# Patient Record
Sex: FEMALE | Race: WHITE | NOT HISPANIC OR LATINO | URBAN - METROPOLITAN AREA
[De-identification: names, ages, dates, MRNs, and addresses within clinical notes are randomized per-mention and may not be internally consistent; named-entity substitution may affect disease eponyms.]

---

## 2019-09-02 ENCOUNTER — HOSPITAL ENCOUNTER (OUTPATIENT)
Dept: URGENT CARE | Facility: CLINIC | Age: 24
Discharge: HOME | End: 2019-09-02

## 2019-09-04 LAB — BACTERIA SPEC AEROBE CULT: NORMAL

## 2023-06-05 ENCOUNTER — OFFICE VISIT (OUTPATIENT)
Dept: OBSTETRICS AND GYNECOLOGY | Age: 28
End: 2023-06-05
Payer: COMMERCIAL

## 2023-06-05 VITALS
HEIGHT: 68 IN | SYSTOLIC BLOOD PRESSURE: 114 MMHG | WEIGHT: 269.8 LBS | DIASTOLIC BLOOD PRESSURE: 80 MMHG | BODY MASS INDEX: 40.89 KG/M2

## 2023-06-05 DIAGNOSIS — Z32.00 ENCOUNTER FOR CONFIRMATION OF PREGNANCY TEST RESULT WITH PHYSICAL EXAMINATION: ICD-10-CM

## 2023-06-05 DIAGNOSIS — Z87.59 HISTORY OF GESTATIONAL HYPERTENSION: ICD-10-CM

## 2023-06-05 DIAGNOSIS — O21.9 NAUSEA AND VOMITING IN PREGNANCY: ICD-10-CM

## 2023-06-05 DIAGNOSIS — O99.210 OBESITY IN PREGNANCY, ANTEPARTUM: ICD-10-CM

## 2023-06-05 DIAGNOSIS — Z01.419 ENCOUNTER FOR GYNECOLOGICAL EXAMINATION: Primary | ICD-10-CM

## 2023-06-05 PROCEDURE — 99213 OFFICE O/P EST LOW 20 MIN: CPT | Performed by: NURSE PRACTITIONER

## 2023-06-05 PROCEDURE — 99395 PREV VISIT EST AGE 18-39: CPT | Performed by: NURSE PRACTITIONER

## 2023-06-05 PROCEDURE — 3008F BODY MASS INDEX DOCD: CPT | Performed by: NURSE PRACTITIONER

## 2023-06-05 PROCEDURE — 2014F MENTAL STATUS ASSESS: CPT | Performed by: NURSE PRACTITIONER

## 2023-06-05 NOTE — PROGRESS NOTES
"Subjective   Alicia Bailey is a 27 y.o. female is being seen today for   Chief Complaint   Patient presents with    [facility]     New pt for pregnancy confirmation, lmp 3/16. Last pap approximately 2017 (neg per pt).    .    History of Present Illness    New patient to our office  Here for AE and pregnancy confirmation  Per LMP is 11w4d but by our ultrasound today is 9w4d with SOAHN 1/4/24  This is her second pregnancy- she has a 7 yr old daughter \"María\" as well  Only problem during that pregnancy was increased BP towards the end   She had a full term vaginal delivery in [place]  She did have some spotting after IC last week but no bleeding since then  She is taking PNV  Over due on her pap  No family history of genetic disorders  Reports periods are very irregular so she isn't surprised that the dates don't line up      The following portions of the patient's history were reviewed and updated as appropriate: allergies, current medications, past family history, past medical history, past social history, past surgical history and problem list.    /80   Ht 172.7 cm (68\")   Wt 122 kg (269 lb 12.8 oz)   LMP 03/16/2023 (Exact Date)   BMI 41.02 kg/m²         Review of Systems   Constitutional: Negative.    HENT: Negative.     Eyes: Negative.    Respiratory: Negative.     Cardiovascular: Negative.    Gastrointestinal:  Positive for nausea and vomiting.   Endocrine: Negative.    Genitourinary: Negative.    Musculoskeletal: Negative.    Skin: Negative.    Allergic/Immunologic: Negative.    Neurological: Negative.    Hematological: Negative.    Psychiatric/Behavioral: Negative.       Objective   Physical Exam  Vitals reviewed.   Constitutional:       Appearance: She is well-developed.   Neck:      Thyroid: No thyroid mass.   Cardiovascular:      Rate and Rhythm: Normal rate and regular rhythm.      Heart sounds: Normal heart sounds.   Pulmonary:      Effort: Pulmonary effort is normal.      Breath sounds: " Normal breath sounds.   Chest:   Breasts:     Right: No mass, nipple discharge, skin change or tenderness.      Left: No mass, nipple discharge, skin change or tenderness.   Abdominal:      Palpations: Abdomen is soft.      Tenderness: There is no abdominal tenderness.   Genitourinary:     Labia:         Right: No rash or lesion.         Left: No rash or lesion.       Vagina: Normal.      Cervix: No cervical motion tenderness, discharge or friability.      Adnexa:         Right: No mass or tenderness.          Left: No mass or tenderness.     Neurological:      Mental Status: She is alert and oriented to person, place, and time.   Psychiatric:         Behavior: Behavior normal.         Assessment & Plan   Diagnoses and all orders for this visit:    1. Encounter for gynecological examination (Primary)  -     IGP,CtNg,rfx Apt HPV All Pth; Future    2. Encounter for confirmation of pregnancy test result with physical examination  -     Hemoglobin A1c  -     Urine Culture - Urine, Urine, Clean Catch  -     Varicella Zoster Antibody, IgG  -     TSH  -     OB Panel With HIV  -     Hemoglobinopathy Fractionation Cascade    3. Obesity in pregnancy, antepartum    4. Nausea and vomiting in pregnancy    5. History of gestational hypertension    Other orders  -     Doxylamine-Pyridoxine ER 20-20 MG tablet controlled-release; Take 1 tablet by mouth 2 (Two) Times a Day.  Dispense: 60 tablet; Refill: 3      Early pregnancy counseling provided and New OB folder given  Patient is taking Prenatal vitamins  Problem list reviewed and updated  Reviewed routine prenatal care with the office to include but not limited to expected weight gain during pregnancy, Tylenol products are fine, avoid aspirin and ibuprofen; Zika (travel restrictions/ok to use insect repellant); not to change cat litter; food restrictions; avoidance of alcohol, tobacco, drugs and saunas/hot tubs. Discussed that the COVID and Flu vaccine is safe and recommended in  pregnancy.   SAB warnings reviewed  All questions answered    Follow up in 1 week for lindsey lab only, then 4 weeks with  for OB intake       Total time spent today with Alicia  was 30-44 minutes (level 3).  Greater than 50% of the time was spent coordinating care, answering her questions and counseling regarding exercise in pregnancy, nutrition in pregnancy, weight gain in pregnancy, work and travel restrictions during pregnancy, list of OTC medications acceptable in pregnancy and call coverage groups and healthy eating habits.

## 2023-06-07 ENCOUNTER — PATIENT MESSAGE (OUTPATIENT)
Dept: OBSTETRICS AND GYNECOLOGY | Age: 28
End: 2023-06-07
Payer: COMMERCIAL

## 2023-06-07 ENCOUNTER — PATIENT ROUNDING (BHMG ONLY) (OUTPATIENT)
Dept: OBSTETRICS AND GYNECOLOGY | Age: 28
End: 2023-06-07
Payer: COMMERCIAL

## 2023-06-07 LAB
ABO GROUP BLD: NORMAL
BACTERIA UR CULT: NO GROWTH
BACTERIA UR CULT: NORMAL
BASOPHILS # BLD AUTO: 0.1 X10E3/UL (ref 0–0.2)
BASOPHILS NFR BLD AUTO: 1 %
BLD GP AB SCN SERPL QL: NEGATIVE
C TRACH RRNA CVX QL NAA+PROBE: NEGATIVE
CONV .: NORMAL
CYTOLOGIST CVX/VAG CYTO: NORMAL
CYTOLOGY CVX/VAG DOC CYTO: NORMAL
CYTOLOGY CVX/VAG DOC THIN PREP: NORMAL
DX ICD CODE: NORMAL
EOSINOPHIL # BLD AUTO: 0.2 X10E3/UL (ref 0–0.4)
EOSINOPHIL NFR BLD AUTO: 2 %
ERYTHROCYTE [DISTWIDTH] IN BLOOD BY AUTOMATED COUNT: 13.2 % (ref 11.7–15.4)
HBA1C MFR BLD: 5.5 % (ref 4.8–5.6)
HBV SURFACE AG SERPL QL IA: NEGATIVE
HCT VFR BLD AUTO: 38.3 % (ref 34–46.6)
HCV IGG SERPL QL IA: NON REACTIVE
HGB A MFR BLD ELPH: 97.3 % (ref 96.4–98.8)
HGB A2 MFR BLD ELPH: 2.7 % (ref 1.8–3.2)
HGB BLD-MCNC: 13.1 G/DL (ref 11.1–15.9)
HGB F MFR BLD ELPH: 0 % (ref 0–2)
HGB FRACT BLD-IMP: NORMAL
HGB S MFR BLD ELPH: 0 %
HIV 1 & 2 AB SER-IMP: NORMAL
HIV 1+2 AB+HIV1 P24 AG SERPL QL IA: NON REACTIVE
IMM GRANULOCYTES # BLD AUTO: 0 X10E3/UL (ref 0–0.1)
IMM GRANULOCYTES NFR BLD AUTO: 0 %
LYMPHOCYTES # BLD AUTO: 2.4 X10E3/UL (ref 0.7–3.1)
LYMPHOCYTES NFR BLD AUTO: 24 %
MCH RBC QN AUTO: 29 PG (ref 26.6–33)
MCHC RBC AUTO-ENTMCNC: 34.2 G/DL (ref 31.5–35.7)
MCV RBC AUTO: 85 FL (ref 79–97)
MONOCYTES # BLD AUTO: 0.7 X10E3/UL (ref 0.1–0.9)
MONOCYTES NFR BLD AUTO: 7 %
N GONORRHOEA RRNA CVX QL NAA+PROBE: NEGATIVE
NEUTROPHILS # BLD AUTO: 6.8 X10E3/UL (ref 1.4–7)
NEUTROPHILS NFR BLD AUTO: 66 %
OTHER STN SPEC: NORMAL
PLATELET # BLD AUTO: 322 X10E3/UL (ref 150–450)
RBC # BLD AUTO: 4.52 X10E6/UL (ref 3.77–5.28)
RH BLD: POSITIVE
RPR SER QL: NON REACTIVE
RUBV IGG SERPL IA-ACNC: 1.84 INDEX
STAT OF ADQ CVX/VAG CYTO-IMP: NORMAL
TSH SERPL DL<=0.005 MIU/L-ACNC: 0.5 UIU/ML (ref 0.45–4.5)
VZV IGG SER IA-ACNC: <135 INDEX
WBC # BLD AUTO: 10.2 X10E3/UL (ref 3.4–10.8)

## 2023-07-05 PROBLEM — A49.3 NONGONOCOCCAL URETHRITIS DUE TO UREAPLASMA UREALYTICUM: Status: ACTIVE | Noted: 2023-07-05

## 2023-07-05 PROBLEM — N34.1 NONGONOCOCCAL URETHRITIS DUE TO UREAPLASMA UREALYTICUM: Status: ACTIVE | Noted: 2023-07-05

## 2023-08-07 ENCOUNTER — TELEPHONE (OUTPATIENT)
Dept: OBSTETRICS AND GYNECOLOGY | Age: 28
End: 2023-08-07

## 2023-08-07 NOTE — TELEPHONE ENCOUNTER
PT CALLING TO RESCHEDULE OB INTAKE APPT WITH DR. ENGEL IN [place].  PT STATES SHE HAD MONO AND WAS UNABLE TO MAKE IT TO THE LAST TWO APPTS. NO APPTS AVAILABLE IN TIMEFRAME NEEDED. PLEASE ADVISE PATIENT -[phone] AT ANYTIME, FINE WITH LVM

## 2023-08-09 ENCOUNTER — INITIAL PRENATAL (OUTPATIENT)
Dept: OBSTETRICS AND GYNECOLOGY | Age: 28
End: 2023-08-09
Payer: COMMERCIAL

## 2023-08-09 VITALS — DIASTOLIC BLOOD PRESSURE: 70 MMHG | BODY MASS INDEX: 40.6 KG/M2 | WEIGHT: 267 LBS | SYSTOLIC BLOOD PRESSURE: 102 MMHG

## 2023-08-09 DIAGNOSIS — O21.9 NAUSEA AND VOMITING IN PREGNANCY: ICD-10-CM

## 2023-08-09 DIAGNOSIS — Z34.00 SUPERVISION OF NORMAL FIRST PREGNANCY, ANTEPARTUM: Primary | ICD-10-CM

## 2023-08-09 DIAGNOSIS — O99.210 OBESITY IN PREGNANCY, ANTEPARTUM: ICD-10-CM

## 2023-08-09 DIAGNOSIS — O26.892 PELVIC PAIN AFFECTING PREGNANCY IN SECOND TRIMESTER, ANTEPARTUM: ICD-10-CM

## 2023-08-09 DIAGNOSIS — Z13.89 SCREENING FOR BLOOD OR PROTEIN IN URINE: ICD-10-CM

## 2023-08-09 DIAGNOSIS — R10.2 PELVIC PAIN AFFECTING PREGNANCY IN SECOND TRIMESTER, ANTEPARTUM: ICD-10-CM

## 2023-08-09 PROBLEM — A49.3 NONGONOCOCCAL URETHRITIS DUE TO UREAPLASMA UREALYTICUM: Status: RESOLVED | Noted: 2023-07-05 | Resolved: 2023-08-09

## 2023-08-09 PROBLEM — Z34.90 SUPERVISION OF NORMAL PREGNANCY: Status: ACTIVE | Noted: 2023-08-09

## 2023-08-09 PROBLEM — O09.899 MATERNAL VARICELLA, NON-IMMUNE: Status: ACTIVE | Noted: 2023-08-09

## 2023-08-09 PROBLEM — Z28.39 MATERNAL VARICELLA, NON-IMMUNE: Status: ACTIVE | Noted: 2023-08-09

## 2023-08-09 PROBLEM — N34.1 NONGONOCOCCAL URETHRITIS DUE TO UREAPLASMA UREALYTICUM: Status: RESOLVED | Noted: 2023-07-05 | Resolved: 2023-08-09

## 2023-08-09 LAB
BILIRUB BLD-MCNC: NEGATIVE MG/DL
CLARITY, POC: CLEAR
COLOR UR: YELLOW
GLUCOSE UR STRIP-MCNC: NEGATIVE MG/DL
KETONES UR QL: NEGATIVE
LEUKOCYTE EST, POC: ABNORMAL
NITRITE UR-MCNC: NEGATIVE MG/ML
PH UR: 6.5 [PH] (ref 5–8)
PROT UR STRIP-MCNC: NEGATIVE MG/DL
RBC # UR STRIP: NEGATIVE /UL
SP GR UR: 1.01 (ref 1–1.03)
UROBILINOGEN UR QL: NORMAL

## 2023-08-09 RX ORDER — PRENATAL VIT NO.126/IRON/FOLIC 28MG-0.8MG
TABLET ORAL DAILY
COMMUNITY

## 2023-08-09 NOTE — PROGRESS NOTES
Chief Complaint   Patient presents with    Routine Prenatal Visit     OB intake, 18w6d, pt c/o vaginal pain on/off when walking or going up stairs       HPI: 27 y.o.  at 18w6d presents for regular OB intake feeling well except for occasional pain in her vaginal area when she walks.    Vitals:    23 1259   BP: 102/70   Weight: 121 kg (267 lb)     Total weight gain for pregnancy:  Not found.    Review of systems:   Gen: negative  CV:     negative  GI: negative  :   negative  MS:    negative  Neuro: negative  Pul: negative    A/P  1. Intrauterine pregnancy at 18w6d   2. Pregnancy Risk:  NORMAL        Diagnoses and all orders for this visit:    1. Supervision of normal first pregnancy, antepartum (Primary)    2. Screening for blood or protein in urine  -     POC Urinalysis Dipstick    3. Nausea and vomiting in pregnancy    4. Obesity in pregnancy, antepartum    5. Pelvic pain affecting pregnancy in second trimester, antepartum        Nutrition and weight gain were addressed.  -----------------------  PLAN:   Return in about 2 weeks (around 2023) for Anatomic survey and OB check.      Lisandro Brown MD  2023 13:47 EDT

## 2023-08-18 PROBLEM — O99.320 METHADONE MAINTENANCE TREATMENT AFFECTING PREGNANCY: Status: ACTIVE | Noted: 2023-08-18

## 2023-08-18 PROBLEM — F11.20 METHADONE MAINTENANCE TREATMENT AFFECTING PREGNANCY: Status: ACTIVE | Noted: 2023-08-18

## 2023-08-23 ENCOUNTER — ROUTINE PRENATAL (OUTPATIENT)
Dept: OBSTETRICS AND GYNECOLOGY | Age: 28
End: 2023-08-23
Payer: COMMERCIAL

## 2023-08-23 VITALS — SYSTOLIC BLOOD PRESSURE: 116 MMHG | BODY MASS INDEX: 41.51 KG/M2 | DIASTOLIC BLOOD PRESSURE: 72 MMHG | WEIGHT: 273 LBS

## 2023-08-23 DIAGNOSIS — F11.20 METHADONE MAINTENANCE TREATMENT AFFECTING PREGNANCY, ANTEPARTUM: ICD-10-CM

## 2023-08-23 DIAGNOSIS — O99.320 METHADONE MAINTENANCE TREATMENT AFFECTING PREGNANCY, ANTEPARTUM: ICD-10-CM

## 2023-08-23 DIAGNOSIS — O99.210 OBESITY IN PREGNANCY, ANTEPARTUM: ICD-10-CM

## 2023-08-23 DIAGNOSIS — Z34.00 SUPERVISION OF NORMAL FIRST PREGNANCY, ANTEPARTUM: Primary | ICD-10-CM

## 2023-08-23 PROBLEM — Z36.89 ENCOUNTER FOR FETAL ANATOMIC SURVEY: Status: ACTIVE | Noted: 2023-08-23

## 2023-08-23 NOTE — PROGRESS NOTES
Chief Complaint   Patient presents with    Routine Prenatal Visit     CC:ob check, 20w6d, had U/S today, unable to void       HPI: 27 y.o.  at 20w6d presents feeling well without any complaints.  Ultrasound is completed today and the survey is incomplete.  Discussed follow-up.  The patient is on methadone for history of opiate abuse disorder.  She is currently working hard to reduce her dose and plans to be off the drug by delivery.    Vitals:    23 1123   BP: 116/72   Weight: 124 kg (273 lb)     Total weight gain for pregnancy:  2.722 kg (6 lb)    Review of systems:   Gen: negative  CV:     negative  GI: negative  :   negative  MS:    negative  Neuro: negative  Pul: negative    A/P  1. Intrauterine pregnancy at 20w6d   2. Pregnancy Risk:  COMPLICATED        Diagnoses and all orders for this visit:    1. Supervision of normal first pregnancy, antepartum (Primary)    2. Methadone maintenance treatment affecting pregnancy, antepartum    3. Obesity in pregnancy, antepartum        Nutrition and weight gain were addressed.  -----------------------  PLAN:   Return in about 4 weeks (around 2023).      Lisandro Brown MD  2023 11:51 EDT

## 2023-08-24 RX ORDER — PNV NO.95/FERROUS FUM/FOLIC AC 28MG-0.8MG
1 TABLET ORAL DAILY
Qty: 30 TABLET | Refills: 6 | Status: SHIPPED | OUTPATIENT
Start: 2023-08-24

## 2023-09-12 ENCOUNTER — REFERRAL TRIAGE (OUTPATIENT)
Dept: LABOR AND DELIVERY | Facility: HOSPITAL | Age: 28
End: 2023-09-12
Payer: COMMERCIAL

## 2023-09-14 ENCOUNTER — PATIENT OUTREACH (OUTPATIENT)
Dept: LABOR AND DELIVERY | Facility: HOSPITAL | Age: 28
End: 2023-09-14
Payer: COMMERCIAL

## 2023-09-14 NOTE — OUTREACH NOTE
[facility]  Unable to Reach       Questions/Answers      Flowsheet Row Responses   Pending Outreach Confirm Patient Interest   Call Attempt First   Outcome Left message   Next Call Attempt Date 09/18/23            Shannan MONACO - RN  Maternity Nurse Navigator    9/14/2023, 09:32 EDT

## 2023-09-18 ENCOUNTER — PATIENT OUTREACH (OUTPATIENT)
Dept: LABOR AND DELIVERY | Facility: HOSPITAL | Age: 28
End: 2023-09-18
Payer: COMMERCIAL

## 2023-09-18 NOTE — OUTREACH NOTE
[facility]  Unable to Reach       Questions/Answers      Flowsheet Row Responses   Pending Outreach Confirm Patient Interest   Call Attempt Second   Outcome Left message          UTR for program, please reach out with any specific patient needs.     Shannan MONACO - RN  Maternity Nurse Navigator    9/18/2023, 12:27 EDT

## 2023-09-21 ENCOUNTER — ROUTINE PRENATAL (OUTPATIENT)
Dept: OBSTETRICS AND GYNECOLOGY | Age: 28
End: 2023-09-21
Payer: COMMERCIAL

## 2023-09-21 VITALS — BODY MASS INDEX: 39.35 KG/M2 | WEIGHT: 258.8 LBS | SYSTOLIC BLOOD PRESSURE: 128 MMHG | DIASTOLIC BLOOD PRESSURE: 72 MMHG

## 2023-09-21 DIAGNOSIS — O99.320 METHADONE MAINTENANCE TREATMENT AFFECTING PREGNANCY, ANTEPARTUM: ICD-10-CM

## 2023-09-21 DIAGNOSIS — Z13.89 SCREENING FOR BLOOD OR PROTEIN IN URINE: ICD-10-CM

## 2023-09-21 DIAGNOSIS — Z28.39 MATERNAL VARICELLA, NON-IMMUNE: ICD-10-CM

## 2023-09-21 DIAGNOSIS — Z87.59 HISTORY OF GESTATIONAL HYPERTENSION: ICD-10-CM

## 2023-09-21 DIAGNOSIS — F11.20 METHADONE MAINTENANCE TREATMENT AFFECTING PREGNANCY, ANTEPARTUM: ICD-10-CM

## 2023-09-21 DIAGNOSIS — O99.210 OBESITY IN PREGNANCY, ANTEPARTUM: ICD-10-CM

## 2023-09-21 DIAGNOSIS — O09.899 MATERNAL VARICELLA, NON-IMMUNE: ICD-10-CM

## 2023-09-21 DIAGNOSIS — Z34.80 SUPERVISION OF OTHER NORMAL PREGNANCY, ANTEPARTUM: Primary | ICD-10-CM

## 2023-09-21 LAB
BILIRUB BLD-MCNC: ABNORMAL MG/DL
CLARITY, POC: ABNORMAL
COLOR UR: ABNORMAL
GLUCOSE UR STRIP-MCNC: NEGATIVE MG/DL
KETONES UR QL: ABNORMAL
LEUKOCYTE EST, POC: NEGATIVE
NITRITE UR-MCNC: NEGATIVE MG/ML
PH UR: 5.5 [PH] (ref 5–8)
PROT UR STRIP-MCNC: ABNORMAL MG/DL
RBC # UR STRIP: NEGATIVE /UL
SP GR UR: 1.03 (ref 1–1.03)
UROBILINOGEN UR QL: NORMAL

## 2023-09-21 RX ORDER — PNV,CALCIUM 72/IRON/FOLIC ACID 27 MG-1 MG
1 TABLET ORAL DAILY
COMMUNITY
Start: 2023-08-24

## 2023-09-21 NOTE — PROGRESS NOTES
Chief Complaint   Patient presents with    Routine Prenatal Visit     Ob check, 25w0d, repeat anatomy today, no complaints       HPI: 27 y.o.  at 25w0d doing well positive fm, no vb, no lof, last appt with incomplete anatomy repeated today and is complete she is with her mother today, her mother states she is not drinking a lot of water, we discussed this and encouraged her to increase fluids she is now living with her mother    Vitals:    23 0917 23 0927   BP: 132/88 128/72   Weight: 117 kg (258 lb 12.8 oz)      Total weight gain for pregnancy:  -3.719 kg (-8 lb 3.2 oz)    Review of systems:   Gen: negative  CV:     negative  GI: negative  :   good fetal movement noted   MS:    negative  Neuro: denies headaches and visual changes   Pul: negative    A/P  1. Intrauterine pregnancy at 25w0d   2. Pregnancy Risk:  COMPLICATED        Diagnoses and all orders for this visit:    1. Supervision of other normal pregnancy, antepartum (Primary)    2. Screening for blood or protein in urine  -     POC Urinalysis Dipstick    3. Obesity in pregnancy, antepartum    4. Maternal varicella, non-immune    5. History of gestational hypertension    6. Methadone maintenance treatment affecting pregnancy, antepartum      Impression:  Intrauterine pregnancy at 25w0d  Anatomic survey status:  Normal/Complete  Placental location: Lateral  Estimated fetal weight:  808 g  Fetal position: Vertex  MVFP 3.55cm  EFW 55%  Relevant comparison data: Comparison is made with previous fetal ultrasound         labor was discussed.  Warnings were provided.  Nutrition and weight gain were addressed.  -----------------------  PLAN:  complete anatomy increase fluids, exercise, will complete 1HR GTT and Tdap at next appt  Return in about 4 weeks (around 10/19/2023).      Yoanna Mccullough, APRN  2023 09:55 EDT

## 2023-10-16 ENCOUNTER — ROUTINE PRENATAL (OUTPATIENT)
Dept: OBSTETRICS AND GYNECOLOGY | Age: 28
End: 2023-10-16
Payer: COMMERCIAL

## 2023-10-16 VITALS — BODY MASS INDEX: 40.9 KG/M2 | DIASTOLIC BLOOD PRESSURE: 70 MMHG | SYSTOLIC BLOOD PRESSURE: 130 MMHG | WEIGHT: 269 LBS

## 2023-10-16 DIAGNOSIS — Z87.59 HISTORY OF GESTATIONAL HYPERTENSION: ICD-10-CM

## 2023-10-16 DIAGNOSIS — O21.9 NAUSEA AND VOMITING IN PREGNANCY: ICD-10-CM

## 2023-10-16 DIAGNOSIS — F11.20 METHADONE MAINTENANCE TREATMENT AFFECTING PREGNANCY IN THIRD TRIMESTER: ICD-10-CM

## 2023-10-16 DIAGNOSIS — Z34.00 SUPERVISION OF NORMAL FIRST PREGNANCY, ANTEPARTUM: Primary | ICD-10-CM

## 2023-10-16 DIAGNOSIS — O99.210 OBESITY IN PREGNANCY, ANTEPARTUM: ICD-10-CM

## 2023-10-16 DIAGNOSIS — Z13.89 SCREENING FOR BLOOD OR PROTEIN IN URINE: ICD-10-CM

## 2023-10-16 DIAGNOSIS — Z13.0 SCREENING FOR IRON DEFICIENCY ANEMIA: ICD-10-CM

## 2023-10-16 DIAGNOSIS — Z36.89 ENCOUNTER FOR FETAL ANATOMIC SURVEY: ICD-10-CM

## 2023-10-16 DIAGNOSIS — O99.323 METHADONE MAINTENANCE TREATMENT AFFECTING PREGNANCY IN THIRD TRIMESTER: ICD-10-CM

## 2023-10-16 DIAGNOSIS — Z13.1 SCREENING FOR DIABETES MELLITUS (DM): ICD-10-CM

## 2023-10-16 LAB
BILIRUB BLD-MCNC: NEGATIVE MG/DL
CLARITY, POC: CLEAR
COLOR UR: YELLOW
GLUCOSE UR STRIP-MCNC: NEGATIVE MG/DL
KETONES UR QL: NEGATIVE
LEUKOCYTE EST, POC: ABNORMAL
NITRITE UR-MCNC: NEGATIVE MG/ML
PH UR: 5.5 [PH] (ref 5–8)
PROT UR STRIP-MCNC: ABNORMAL MG/DL
RBC # UR STRIP: NEGATIVE /UL
SP GR UR: 1.02 (ref 1–1.03)
UROBILINOGEN UR QL: ABNORMAL

## 2023-10-16 NOTE — LETTER
10/17/23    SCHEDULING FORM  C-SECTIONS/INDUCTIONS    Patient:  Alicia Bailey  :  1995    Phone: [phone] (home)     OB provider:  Lisandro Brown MD    Summary:  27 y.o. female     Type of Delivery:      Priority:       Desired Date:        Time:      Dating   Estimated Date of Delivery: 24    Gestational age at Desired date of Induction or CS:   weeks   days  ----------------------------------------------------------------------------  By ACOG Guidelines, women should be 39 weeks or greater before initiating an elective induction (non-medically indicated) delivery     Maternal Indications:        Fetal/Placental Conditions:      ----------------------------------------------------------------------------    For patients less than 39 weeks with indications not included in the above list, [facility] consult is required prior to scheduling.    [facility] Approved indication:       Date of consult:      I attest that the above entries are accurate to the best of my knowledge:    Lisandro Brown MD  10/17/2023  10:11 EDT

## 2023-10-16 NOTE — PROGRESS NOTES
Chief Complaint   Patient presents with    Routine Prenatal Visit     CC: Ob  check, 28w4d, 1Hr today, tdap today,  does not desire flu vax, no complaints       HPI: 27 y.o.  at 28w4d presents for regular OB check feeling well without complaints.  The patient was previously on methadone maintenance and is stopped all medication.  She is feeling fine and reports no cravings.    With her last delivery the patient reports difficulty with her epidural anesthesia and she received spinal anesthesia and progressed rapidly thereafter and delivered.  She reports a history of scoliosis.    Vitals:    10/16/23 1104   BP: 130/70   Weight: 122 kg (269 lb)     Total weight gain for pregnancy:  0.907 kg (2 lb)    Review of systems:   Gen: negative  CV:     negative  GI: negative  :   negative and good fetal movement noted   MS:    negative  Neuro: negative  Pul: negative    A/P  1. Intrauterine pregnancy at 28w4d   2. Pregnancy Risk:  COMPLICATED        Diagnoses and all orders for this visit:    1. Supervision of normal first pregnancy, antepartum (Primary)    2. Screening for iron deficiency anemia  -     CBC (No Diff)    3. Screening for diabetes mellitus (DM)  -     Gestational Screen 1 Hr ([facility])    4. Screening for blood or protein in urine  -     POC Urinalysis Dipstick    5. Obesity in pregnancy, antepartum    6. Methadone maintenance treatment affecting pregnancy in third trimester    7. Incomplete fetal anatomic survey    8. History of gestational hypertension    9. Nausea and vomiting in pregnancy    Other orders  -     Tdap Vaccine => 6yo IM (BOOSTRIX)        Pre-eclampsia symptoms were discussed and warnings were given.   labor was discussed.  Warnings were provided.  -----------------------  PLAN:   Return in about 2 weeks (around 10/30/2023) for OB visit.      Lisandro Brown MD  10/16/2023 11:49 EDT

## 2023-10-17 LAB
ERYTHROCYTE [DISTWIDTH] IN BLOOD BY AUTOMATED COUNT: 13.1 % (ref 11.7–15.4)
GLUCOSE 1H P 50 G GLC PO SERPL-MCNC: 105 MG/DL (ref 70–139)
HCT VFR BLD AUTO: 32.2 % (ref 34–46.6)
HGB BLD-MCNC: 10.7 G/DL (ref 11.1–15.9)
MCH RBC QN AUTO: 27.9 PG (ref 26.6–33)
MCHC RBC AUTO-ENTMCNC: 33.2 G/DL (ref 31.5–35.7)
MCV RBC AUTO: 84 FL (ref 79–97)
PLATELET # BLD AUTO: 403 X10E3/UL (ref 150–450)
RBC # BLD AUTO: 3.83 X10E6/UL (ref 3.77–5.28)
WBC # BLD AUTO: 10.3 X10E3/UL (ref 3.4–10.8)

## 2023-10-17 NOTE — PROGRESS NOTES
Notify patient:  One hour glucose tolerance screening is negative for gestational diabetes     Mild anemia is noted.  Start twice daily oral Iron.

## 2023-10-30 ENCOUNTER — ROUTINE PRENATAL (OUTPATIENT)
Dept: OBSTETRICS AND GYNECOLOGY | Age: 28
End: 2023-10-30
Payer: COMMERCIAL

## 2023-10-30 VITALS — WEIGHT: 273 LBS | DIASTOLIC BLOOD PRESSURE: 74 MMHG | BODY MASS INDEX: 41.51 KG/M2 | SYSTOLIC BLOOD PRESSURE: 134 MMHG

## 2023-10-30 DIAGNOSIS — O99.210 OBESITY IN PREGNANCY, ANTEPARTUM: ICD-10-CM

## 2023-10-30 DIAGNOSIS — Z13.89 SCREENING FOR BLOOD OR PROTEIN IN URINE: ICD-10-CM

## 2023-10-30 DIAGNOSIS — O99.323 METHADONE MAINTENANCE TREATMENT AFFECTING PREGNANCY IN THIRD TRIMESTER: ICD-10-CM

## 2023-10-30 DIAGNOSIS — Z34.00 SUPERVISION OF NORMAL FIRST PREGNANCY, ANTEPARTUM: Primary | ICD-10-CM

## 2023-10-30 DIAGNOSIS — F11.20 METHADONE MAINTENANCE TREATMENT AFFECTING PREGNANCY IN THIRD TRIMESTER: ICD-10-CM

## 2023-10-30 PROBLEM — Z36.89 ENCOUNTER FOR FETAL ANATOMIC SURVEY: Status: RESOLVED | Noted: 2023-08-23 | Resolved: 2023-10-30

## 2023-10-30 LAB
BILIRUB BLD-MCNC: NEGATIVE MG/DL
CLARITY, POC: CLEAR
COLOR UR: YELLOW
GLUCOSE UR STRIP-MCNC: NEGATIVE MG/DL
KETONES UR QL: NEGATIVE
LEUKOCYTE EST, POC: ABNORMAL
NITRITE UR-MCNC: NEGATIVE MG/ML
PH UR: 5.5 [PH] (ref 5–8)
PROT UR STRIP-MCNC: NEGATIVE MG/DL
RBC # UR STRIP: NEGATIVE /UL
SP GR UR: 1.02 (ref 1–1.03)
UROBILINOGEN UR QL: ABNORMAL

## 2023-10-30 NOTE — PROGRESS NOTES
Chief Complaint   Patient presents with    Routine Prenatal Visit     CC: ob check, 30w4d, no complaints       HPI: 28 y.o.  at 30w4d presents for regular OB check without complaints.      Relevant data reviewed:   OB Follow Up Transabdominal Approach (2023 09:55)     Vitals:    10/30/23 1051   BP: 134/74   Weight: 124 kg (273 lb)     Total weight gain for pregnancy:  2.722 kg (6 lb)    Review of systems:   Gen: negative  CV:     negative  GI: negative  :   negative and good fetal movement noted   MS:    negative  Neuro: negative  Pul: negative    A/P  1. Intrauterine pregnancy at 30w4d   2. Pregnancy Risk:  NORMAL          Diagnoses and all orders for this visit:    1. Supervision of normal first pregnancy, antepartum (Primary)    2. Screening for blood or protein in urine  -     POC Urinalysis Dipstick    3. Obesity in pregnancy, antepartum    4. Methadone maintenance treatment affecting pregnancy in third trimester        Pre-eclampsia symptoms were discussed and warnings were given.   labor was discussed.  Warnings were provided.  -----------------------  PLAN:   Return in about 2 weeks (around 2023).      Lisandro Brown MD  10/30/2023 11:23 EDT

## 2023-11-15 ENCOUNTER — ROUTINE PRENATAL (OUTPATIENT)
Dept: OBSTETRICS AND GYNECOLOGY | Age: 28
End: 2023-11-15
Payer: COMMERCIAL

## 2023-11-15 VITALS — WEIGHT: 275 LBS | SYSTOLIC BLOOD PRESSURE: 122 MMHG | DIASTOLIC BLOOD PRESSURE: 78 MMHG | BODY MASS INDEX: 41.81 KG/M2

## 2023-11-15 DIAGNOSIS — O99.210 OBESITY IN PREGNANCY, ANTEPARTUM: ICD-10-CM

## 2023-11-15 DIAGNOSIS — Z3A.32 32 WEEKS GESTATION OF PREGNANCY: Primary | ICD-10-CM

## 2023-11-15 DIAGNOSIS — F11.20 METHADONE MAINTENANCE TREATMENT AFFECTING PREGNANCY IN THIRD TRIMESTER: ICD-10-CM

## 2023-11-15 DIAGNOSIS — Z34.03 ENCOUNTER FOR SUPERVISION OF NORMAL FIRST PREGNANCY IN THIRD TRIMESTER: ICD-10-CM

## 2023-11-15 DIAGNOSIS — O99.323 METHADONE MAINTENANCE TREATMENT AFFECTING PREGNANCY IN THIRD TRIMESTER: ICD-10-CM

## 2023-11-15 NOTE — PROGRESS NOTES
Chief Complaint   Patient presents with    Routine Prenatal Visit     CC: Ob follow up, ob check 32w6d. No complaints today.       HPI: 28 y.o.  at 32w6d the patient presents for her regular exam feeling well without complaints.    Vitals:    11/15/23 1035   BP: 122/78   Weight: 125 kg (275 lb)     Total weight gain for pregnancy:  3.629 kg (8 lb)    Review of systems:   Gen: negative  CV:     negative  GI: negative  :   negative and good fetal movement noted   MS:    negative  Neuro: negative  Pul: negative    A/P  1. Intrauterine pregnancy at 32w6d   2. Pregnancy Risk:  NORMAL        Diagnoses and all orders for this visit:    1. 32 weeks gestation of pregnancy (Primary)  -     POC Urinalysis Dipstick    2. Encounter for supervision of normal first pregnancy in third trimester    3. Obesity in pregnancy, antepartum    4. Methadone maintenance treatment affecting pregnancy in third trimester        Pre-eclampsia symptoms were discussed and warnings were given.   labor was discussed.  Warnings were provided.  -----------------------  PLAN:   Return in about 2 weeks (around 2023) for OB visit.      Lisandro Brown MD  11/15/2023 10:54 EST

## 2023-11-26 ENCOUNTER — HOSPITAL ENCOUNTER (EMERGENCY)
Facility: HOSPITAL | Age: 28
Discharge: HOME | End: 2023-11-26
Attending: OBSTETRICS & GYNECOLOGY | Admitting: OBSTETRICS & GYNECOLOGY
Payer: COMMERCIAL

## 2023-11-26 VITALS
SYSTOLIC BLOOD PRESSURE: 123 MMHG | DIASTOLIC BLOOD PRESSURE: 81 MMHG | WEIGHT: 240.6 LBS | TEMPERATURE: 97.8 F | OXYGEN SATURATION: 96 % | BODY MASS INDEX: 36.46 KG/M2 | HEIGHT: 68 IN | HEART RATE: 86 BPM | RESPIRATION RATE: 17 BRPM

## 2023-11-26 PROBLEM — R51.9 HEADACHE IN PREGNANCY, ANTEPARTUM, THIRD TRIMESTER: Status: ACTIVE | Noted: 2023-11-26

## 2023-11-26 PROBLEM — O21.9 NAUSEA AND VOMITING IN PREGNANCY: Status: ACTIVE | Noted: 2023-11-26

## 2023-11-26 PROBLEM — O26.893 HEADACHE IN PREGNANCY, ANTEPARTUM, THIRD TRIMESTER: Status: ACTIVE | Noted: 2023-11-26

## 2023-11-26 LAB
ALBUMIN SERPL-MCNC: 3.4 G/DL (ref 3.5–5.2)
ALBUMIN/GLOB SERPL: 1.1 G/DL
ALP SERPL-CCNC: 160 U/L (ref 39–117)
ALT SERPL W P-5'-P-CCNC: 45 U/L (ref 1–33)
AMPHET+METHAMPHET UR QL: NEGATIVE
ANION GAP SERPL CALCULATED.3IONS-SCNC: 13.3 MMOL/L (ref 5–15)
AST SERPL-CCNC: 58 U/L (ref 1–32)
BACTERIA UR QL AUTO: ABNORMAL /HPF
BARBITURATES UR QL SCN: NEGATIVE
BASOPHILS # BLD AUTO: 0.03 10*3/MM3 (ref 0–0.2)
BASOPHILS NFR BLD AUTO: 0.3 % (ref 0–1.5)
BENZODIAZ UR QL SCN: NEGATIVE
BILIRUB SERPL-MCNC: 0.2 MG/DL (ref 0–1.2)
BILIRUB UR QL STRIP: NEGATIVE
BUN SERPL-MCNC: 7 MG/DL (ref 6–20)
BUN/CREAT SERPL: 12.3 (ref 7–25)
CALCIUM SPEC-SCNC: 9.4 MG/DL (ref 8.6–10.5)
CANNABINOIDS SERPL QL: NEGATIVE
CHLORIDE SERPL-SCNC: 104 MMOL/L (ref 98–107)
CLARITY UR: ABNORMAL
CO2 SERPL-SCNC: 20.7 MMOL/L (ref 22–29)
COCAINE UR QL: NEGATIVE
COLOR UR: ABNORMAL
CREAT SERPL-MCNC: 0.57 MG/DL (ref 0.57–1)
CREAT UR-MCNC: 162.1 MG/DL
DEPRECATED RDW RBC AUTO: 41.3 FL (ref 37–54)
EGFRCR SERPLBLD CKD-EPI 2021: 127.9 ML/MIN/1.73
EOSINOPHIL # BLD AUTO: 0.09 10*3/MM3 (ref 0–0.4)
EOSINOPHIL NFR BLD AUTO: 1 % (ref 0.3–6.2)
ERYTHROCYTE [DISTWIDTH] IN BLOOD BY AUTOMATED COUNT: 13.6 % (ref 12.3–15.4)
FENTANYL UR-MCNC: NEGATIVE NG/ML
GLOBULIN UR ELPH-MCNC: 3.2 GM/DL
GLUCOSE SERPL-MCNC: 97 MG/DL (ref 65–99)
GLUCOSE UR STRIP-MCNC: NEGATIVE MG/DL
HCT VFR BLD AUTO: 31.4 % (ref 34–46.6)
HGB BLD-MCNC: 10.5 G/DL (ref 12–15.9)
HGB UR QL STRIP.AUTO: NEGATIVE
HYALINE CASTS UR QL AUTO: ABNORMAL /LPF
IMM GRANULOCYTES # BLD AUTO: 0.08 10*3/MM3 (ref 0–0.05)
IMM GRANULOCYTES NFR BLD AUTO: 0.9 % (ref 0–0.5)
KETONES UR QL STRIP: ABNORMAL
LEUKOCYTE ESTERASE UR QL STRIP.AUTO: ABNORMAL
LYMPHOCYTES # BLD AUTO: 2.24 10*3/MM3 (ref 0.7–3.1)
LYMPHOCYTES NFR BLD AUTO: 24.3 % (ref 19.6–45.3)
MCH RBC QN AUTO: 27.7 PG (ref 26.6–33)
MCHC RBC AUTO-ENTMCNC: 33.4 G/DL (ref 31.5–35.7)
MCV RBC AUTO: 82.8 FL (ref 79–97)
METHADONE UR QL SCN: NEGATIVE
MONOCYTES # BLD AUTO: 0.77 10*3/MM3 (ref 0.1–0.9)
MONOCYTES NFR BLD AUTO: 8.3 % (ref 5–12)
NEUTROPHILS NFR BLD AUTO: 6.02 10*3/MM3 (ref 1.7–7)
NEUTROPHILS NFR BLD AUTO: 65.2 % (ref 42.7–76)
NITRITE UR QL STRIP: NEGATIVE
NRBC BLD AUTO-RTO: 0 /100 WBC (ref 0–0.2)
OPIATES UR QL: NEGATIVE
OXYCODONE UR QL SCN: NEGATIVE
PH UR STRIP.AUTO: 7 [PH] (ref 5–8)
PLATELET # BLD AUTO: 335 10*3/MM3 (ref 140–450)
PMV BLD AUTO: 9.5 FL (ref 6–12)
POTASSIUM SERPL-SCNC: 3.9 MMOL/L (ref 3.5–5.2)
PROT ?TM UR-MCNC: 15.6 MG/DL
PROT SERPL-MCNC: 6.6 G/DL (ref 6–8.5)
PROT UR QL STRIP: ABNORMAL
PROT/CREAT UR: 96.2 MG/G CREA (ref 0–200)
RBC # BLD AUTO: 3.79 10*6/MM3 (ref 3.77–5.28)
RBC # UR STRIP: ABNORMAL /HPF
REF LAB TEST METHOD: ABNORMAL
SODIUM SERPL-SCNC: 138 MMOL/L (ref 136–145)
SP GR UR STRIP: 1.02 (ref 1–1.03)
SQUAMOUS #/AREA URNS HPF: ABNORMAL /HPF
UROBILINOGEN UR QL STRIP: ABNORMAL
WBC # UR STRIP: ABNORMAL /HPF
WBC NRBC COR # BLD AUTO: 9.23 10*3/MM3 (ref 3.4–10.8)

## 2023-11-26 PROCEDURE — 80307 DRUG TEST PRSMV CHEM ANLYZR: CPT | Performed by: OBSTETRICS & GYNECOLOGY

## 2023-11-26 PROCEDURE — 81001 URINALYSIS AUTO W/SCOPE: CPT | Performed by: OBSTETRICS & GYNECOLOGY

## 2023-11-26 PROCEDURE — 25810000003 LACTATED RINGERS SOLUTION: Performed by: OBSTETRICS & GYNECOLOGY

## 2023-11-26 PROCEDURE — 59025 FETAL NON-STRESS TEST: CPT

## 2023-11-26 PROCEDURE — 82570 ASSAY OF URINE CREATININE: CPT | Performed by: OBSTETRICS & GYNECOLOGY

## 2023-11-26 PROCEDURE — 93005 ELECTROCARDIOGRAM TRACING: CPT | Performed by: OBSTETRICS & GYNECOLOGY

## 2023-11-26 PROCEDURE — 87086 URINE CULTURE/COLONY COUNT: CPT | Performed by: OBSTETRICS & GYNECOLOGY

## 2023-11-26 PROCEDURE — 99284 EMERGENCY DEPT VISIT MOD MDM: CPT | Performed by: OBSTETRICS & GYNECOLOGY

## 2023-11-26 PROCEDURE — 96361 HYDRATE IV INFUSION ADD-ON: CPT | Performed by: OBSTETRICS & GYNECOLOGY

## 2023-11-26 PROCEDURE — 85025 COMPLETE CBC W/AUTO DIFF WBC: CPT | Performed by: OBSTETRICS & GYNECOLOGY

## 2023-11-26 PROCEDURE — 96374 THER/PROPH/DIAG INJ IV PUSH: CPT | Performed by: OBSTETRICS & GYNECOLOGY

## 2023-11-26 PROCEDURE — 25010000002 ONDANSETRON PER 1 MG: Performed by: OBSTETRICS & GYNECOLOGY

## 2023-11-26 PROCEDURE — 84156 ASSAY OF PROTEIN URINE: CPT | Performed by: OBSTETRICS & GYNECOLOGY

## 2023-11-26 PROCEDURE — 80053 COMPREHEN METABOLIC PANEL: CPT | Performed by: OBSTETRICS & GYNECOLOGY

## 2023-11-26 RX ORDER — ONDANSETRON 2 MG/ML
4 INJECTION INTRAMUSCULAR; INTRAVENOUS EVERY 6 HOURS PRN
Status: DISCONTINUED | OUTPATIENT
Start: 2023-11-26 | End: 2023-11-26 | Stop reason: HOSPADM

## 2023-11-26 RX ORDER — SODIUM CHLORIDE 0.9 % (FLUSH) 0.9 %
10 SYRINGE (ML) INJECTION AS NEEDED
Status: DISCONTINUED | OUTPATIENT
Start: 2023-11-26 | End: 2023-11-26 | Stop reason: HOSPADM

## 2023-11-26 RX ORDER — SODIUM CHLORIDE 0.9 % (FLUSH) 0.9 %
10 SYRINGE (ML) INJECTION EVERY 12 HOURS SCHEDULED
Status: DISCONTINUED | OUTPATIENT
Start: 2023-11-26 | End: 2023-11-26 | Stop reason: HOSPADM

## 2023-11-26 RX ORDER — SODIUM CHLORIDE 9 MG/ML
40 INJECTION, SOLUTION INTRAVENOUS AS NEEDED
Status: DISCONTINUED | OUTPATIENT
Start: 2023-11-26 | End: 2023-11-26 | Stop reason: HOSPADM

## 2023-11-26 RX ORDER — ACETAMINOPHEN 500 MG
1000 TABLET ORAL ONCE
Status: COMPLETED | OUTPATIENT
Start: 2023-11-26 | End: 2023-11-26

## 2023-11-26 RX ADMIN — ONDANSETRON 4 MG: 2 INJECTION INTRAMUSCULAR; INTRAVENOUS at 16:20

## 2023-11-26 RX ADMIN — ACETAMINOPHEN 1000 MG: 500 TABLET ORAL at 16:21

## 2023-11-26 RX ADMIN — SODIUM CHLORIDE, POTASSIUM CHLORIDE, SODIUM LACTATE AND CALCIUM CHLORIDE 500 ML: 600; 310; 30; 20 INJECTION, SOLUTION INTRAVENOUS at 16:02

## 2023-11-26 NOTE — H&P
"[facility]  Alicia Bailey  : 1995  MRN: [id number]  CSN: [id number]    OB ED Provider Note    Subjective   Chief Complaint   Patient presents with    Rapid Heart Rate     JUANITA-  at 34+3wks arrives from home with c/o tachycardia, H/A, Nausea, and dizziness. Pt reports H/A started at 1100, pt reports constant ache, pt denies reports did not attempt to take any meds for pain. Pt reports dizziness and nausea started at 1100 as well. PT reports \"racing heart rate\" for 3 days. Pt reports +FM and denies LOF, VB, ctxs, pain. Abdomen palpates soft    Dizziness    Headache    Nausea     Alicia Bailey is a 28 y.o. year old  with an Estimated Date of Delivery: 24 currently at 34w3d presenting with complaint of headache, nausea and tachycardia. Patient reports her headache is mild and frontal. She has not taken any medications for it. She reports intermittent nausea since yesterday. Having intermittent sweating and feels her \"heart is beating faster and harder than before\" for the past 3 days. She denies chest pain or dyspnea.    Prenatal care has been with history of heroin abuse. She was using methadone but reports she stopped using it.  She does vape daily but denies other drug use. She also has h/o IBS, denies changes in her stool. She also reports she is anemic, taking PO iron but did not take today.    OB History    Para Term  AB Living   2 1 1 0 0 1   SAB IAB Ectopic Molar Multiple Live Births   0 0 0 0 0 1      # Outcome Date GA Lbr José Miguel/2nd Weight Sex Delivery Anes PTL Lv   2 Current            1 Term 18 39w4d   F Vag-Spont EPI  PIETRO      Name: María     Past Medical History:   Diagnosis Date    Methadone use 2023    hx of opiate use; no methadone or opiate use since 2023    Anxiety     IBS (irritable bowel syndrome)      Past Surgical History:   Procedure Laterality Date    COLONOSCOPY         Current Facility-Administered Medications:     lactated " "ringers bolus 500 mL, 500 mL, Intravenous, Once, Karolyn Acosta MD    sodium chloride 0.9 % flush 10 mL, 10 mL, Intravenous, Q12H, Karolyn Acosta MD    sodium chloride 0.9 % flush 10 mL, 10 mL, Intravenous, PRN, Karolyn Acosta MD    sodium chloride 0.9 % flush 10 mL, 10 mL, Intravenous, Q12H, Karolyn Acosta MD    sodium chloride 0.9 % flush 10 mL, 10 mL, Intravenous, PRN, Karolyn Acosta MD    sodium chloride 0.9 % infusion 40 mL, 40 mL, Intravenous, PRN, Karolyn Acosta MD    Allergies   Allergen Reactions    Aspirin Hives and Rash     Social History    Tobacco Use      Smoking status: Former        Types: Cigarettes      Smokeless tobacco: Never    Review of Systems      Objective   /74   Pulse (!) 121   Temp 97.8 °F (36.6 °C) (Oral)   Resp 18   Ht 172.7 cm (68\")   LMP 03/16/2023 (Exact Date)   SpO2 94%   BMI 41.81 kg/m²   General: well developed; well nourished  no acute distress   Abdomen: soft, non-tender; no masses  gravid    FHT's: reactive      Cervix: was not checked.       Contractions: none   Chest: Unlabored respirations    CV:  normal rate, regular rhythm,  no murmurs, rubs, or gallops   Ext:   No C/C/E   Back: No CVA tenderness       Prenatal Labs  Lab Results   Component Value Date    HGB 10.7 (L) 10/16/2023    RUBELLAABIGG 1.84 06/05/2023    HEPBSAG Negative 06/05/2023    ABSCRN Negative 06/05/2023    BYZ9CAX7 Non Reactive 06/05/2023    HEPCVIRUSABY Non Reactive 06/05/2023     10/16/2023    URINECX Final report 06/05/2023    CHLAMNAA Negative 06/05/2023    NGONORRHON Negative 06/05/2023       Current Labs Reviewed   CBC:   Lab Results   Component Value Date    WBC 9.23 11/26/2023    HGB 10.5 (L) 11/26/2023    HCT 31.4 (L) 11/26/2023     11/26/2023     CMP:   Lab Results   Component Value Date     11/26/2023    K 3.9 11/26/2023     11/26/2023    CO2 20.7 (L) 11/26/2023    BUN 7 11/26/2023    CREATININE 0.57 11/26/2023    GLUCOSE 97 11/26/2023    ALBUMIN " 3.4 (L) 11/26/2023    CALCIUM 9.4 11/26/2023    AST 58 (H) 11/26/2023    ALT 45 (H) 11/26/2023    BILITOT 0.2 11/26/2023     UA:    Lab Results   Component Value Date    SQUAMEPIUA 13-20 (A) 11/26/2023    SPECGRAVUR 1.024 11/26/2023    KETONESU Trace (A) 11/26/2023    BLOODU Negative 11/26/2023    LEUKOCYTESUR Moderate (2+) (A) 11/26/2023    NITRITEU Negative 11/26/2023    RBCUA 0-2 11/26/2023    WBCUA 6-10 (A) 11/26/2023    BACTERIA 4+ (A) 11/26/2023          Assessment and Plan  IUP at 34w3d  Headache, nausea  BP's 120's/70's, afebrile  Pre-e labs Plts 335, AST/ALT 58/45, P/C 0.09  Tachycardia  EKG normal, HR 83  2.   Will give IV fluid bolus  4.    Anemia   - Hgb 10.5       Raquel Acosta MD  11/26/2023  15:55 EST    Patient's headache and nausea resolved. Discussed labs including elevated LFT's w/ Dr. Porras, will discharge patient and repeat labs in office this week. Pt has appt on 11/29.    Raquel Acosta MD

## 2023-11-27 LAB
BACTERIA SPEC AEROBE CULT: NORMAL
QT INTERVAL: 343 MS
QTC INTERVAL: 403 MS

## 2023-11-29 ENCOUNTER — ROUTINE PRENATAL (OUTPATIENT)
Dept: OBSTETRICS AND GYNECOLOGY | Age: 28
End: 2023-11-29
Payer: COMMERCIAL

## 2023-11-29 VITALS — DIASTOLIC BLOOD PRESSURE: 82 MMHG | WEIGHT: 281 LBS | SYSTOLIC BLOOD PRESSURE: 124 MMHG | BODY MASS INDEX: 42.73 KG/M2

## 2023-11-29 DIAGNOSIS — O99.323 METHADONE MAINTENANCE TREATMENT AFFECTING PREGNANCY IN THIRD TRIMESTER: ICD-10-CM

## 2023-11-29 DIAGNOSIS — F11.20 METHADONE MAINTENANCE TREATMENT AFFECTING PREGNANCY IN THIRD TRIMESTER: ICD-10-CM

## 2023-11-29 DIAGNOSIS — Z34.03 ENCOUNTER FOR SUPERVISION OF NORMAL FIRST PREGNANCY IN THIRD TRIMESTER: Primary | ICD-10-CM

## 2023-11-29 DIAGNOSIS — Z13.89 SCREENING FOR BLOOD OR PROTEIN IN URINE: ICD-10-CM

## 2023-11-29 DIAGNOSIS — O99.210 OBESITY IN PREGNANCY, ANTEPARTUM: ICD-10-CM

## 2023-11-29 PROBLEM — O26.893 HEADACHE IN PREGNANCY, ANTEPARTUM, THIRD TRIMESTER: Status: RESOLVED | Noted: 2023-11-26 | Resolved: 2023-11-29

## 2023-11-29 PROBLEM — R51.9 HEADACHE IN PREGNANCY, ANTEPARTUM, THIRD TRIMESTER: Status: RESOLVED | Noted: 2023-11-26 | Resolved: 2023-11-29

## 2023-11-29 PROBLEM — O21.9 NAUSEA AND VOMITING IN PREGNANCY: Status: RESOLVED | Noted: 2023-06-05 | Resolved: 2023-11-29

## 2023-11-29 LAB
BILIRUB BLD-MCNC: NEGATIVE MG/DL
CLARITY, POC: CLEAR
COLOR UR: YELLOW
GLUCOSE UR STRIP-MCNC: NEGATIVE MG/DL
KETONES UR QL: NEGATIVE
LEUKOCYTE EST, POC: NEGATIVE
NITRITE UR-MCNC: NEGATIVE MG/ML
PH UR: 6 [PH] (ref 5–8)
PROT UR STRIP-MCNC: NEGATIVE MG/DL
RBC # UR STRIP: NEGATIVE /UL
SP GR UR: 1.02 (ref 1–1.03)
UROBILINOGEN UR QL: NORMAL

## 2023-11-29 RX ORDER — FERROUS SULFATE 325(65) MG
325 TABLET ORAL
COMMUNITY

## 2023-11-29 NOTE — PROGRESS NOTES
Chief Complaint   Patient presents with    Routine Prenatal Visit     CC: Ob check 34w6d. No complaints today.         HPI: 28 y.o.  at 34w6d presents for regular OB check feeling well without complaints.    Vitals:    23 1006   BP: 124/82   Weight: 127 kg (281 lb)     Total weight gain for pregnancy:  6.35 kg (14 lb)    Review of systems:   Gen: negative  CV:     negative  GI: negative  :   negative and good fetal movement noted   MS:    negative  Neuro: negative  Pul: negative      Last Office ultrasound data:  Gestational age:  25 weeks   EFW%                55%ile  AC%                41%ile        A/P  1. Intrauterine pregnancy at 34w6d   2. Pregnancy Risk:  COMPLICATED        Diagnoses and all orders for this visit:    1. Screening for blood or protein in urine (Primary)  -     POC Urinalysis Dipstick    2. Encounter for supervision of normal first pregnancy in third trimester    3. Obesity in pregnancy, antepartum    4. Methadone maintenance treatment affecting pregnancy in third trimester          Pre-eclampsia symptoms were discussed and warnings were given.   labor was discussed.  Warnings were provided.  -----------------------  PLAN:   Return in about 1 week (around 2023) for OB visit.      Lisandro Brown MD  2023 10:16 EST

## 2023-12-07 ENCOUNTER — ROUTINE PRENATAL (OUTPATIENT)
Dept: OBSTETRICS AND GYNECOLOGY | Age: 28
End: 2023-12-07
Payer: COMMERCIAL

## 2023-12-07 VITALS — SYSTOLIC BLOOD PRESSURE: 138 MMHG | DIASTOLIC BLOOD PRESSURE: 82 MMHG | WEIGHT: 283.2 LBS | BODY MASS INDEX: 43.06 KG/M2

## 2023-12-07 DIAGNOSIS — Z29.11 NEED FOR RSV VACCINATION: ICD-10-CM

## 2023-12-07 DIAGNOSIS — Z34.80 SUPERVISION OF OTHER NORMAL PREGNANCY, ANTEPARTUM: ICD-10-CM

## 2023-12-07 DIAGNOSIS — O99.210 OBESITY IN PREGNANCY, ANTEPARTUM: ICD-10-CM

## 2023-12-07 DIAGNOSIS — O09.899 MATERNAL VARICELLA, NON-IMMUNE: ICD-10-CM

## 2023-12-07 DIAGNOSIS — Z28.39 MATERNAL VARICELLA, NON-IMMUNE: ICD-10-CM

## 2023-12-07 DIAGNOSIS — Z3A.36 36 WEEKS GESTATION OF PREGNANCY: Primary | ICD-10-CM

## 2023-12-07 DIAGNOSIS — Z13.89 SCREENING FOR BLOOD OR PROTEIN IN URINE: ICD-10-CM

## 2023-12-07 LAB
CLARITY, POC: CLEAR
COLOR UR: YELLOW
GLUCOSE UR STRIP-MCNC: NEGATIVE MG/DL
PROT UR STRIP-MCNC: ABNORMAL MG/DL

## 2023-12-11 ENCOUNTER — ROUTINE PRENATAL (OUTPATIENT)
Dept: OBSTETRICS AND GYNECOLOGY | Age: 28
End: 2023-12-11
Payer: COMMERCIAL

## 2023-12-11 VITALS — WEIGHT: 283.6 LBS | BODY MASS INDEX: 43.12 KG/M2 | DIASTOLIC BLOOD PRESSURE: 82 MMHG | SYSTOLIC BLOOD PRESSURE: 126 MMHG

## 2023-12-11 DIAGNOSIS — Z28.39 MATERNAL VARICELLA, NON-IMMUNE: ICD-10-CM

## 2023-12-11 DIAGNOSIS — O09.899 MATERNAL VARICELLA, NON-IMMUNE: ICD-10-CM

## 2023-12-11 DIAGNOSIS — O99.210 OBESITY IN PREGNANCY, ANTEPARTUM: ICD-10-CM

## 2023-12-11 DIAGNOSIS — Z13.89 SCREENING FOR BLOOD OR PROTEIN IN URINE: ICD-10-CM

## 2023-12-11 DIAGNOSIS — Z87.59 HISTORY OF GESTATIONAL HYPERTENSION: ICD-10-CM

## 2023-12-11 DIAGNOSIS — Z3A.36 36 WEEKS GESTATION OF PREGNANCY: Primary | ICD-10-CM

## 2023-12-11 LAB
B-HEM STREP SPEC QL CULT: NEGATIVE
BILIRUB BLD-MCNC: NEGATIVE MG/DL
CLARITY, POC: CLEAR
COLOR UR: YELLOW
GLUCOSE UR STRIP-MCNC: NEGATIVE MG/DL
KETONES UR QL: NEGATIVE
LEUKOCYTE EST, POC: ABNORMAL
NITRITE UR-MCNC: NEGATIVE MG/ML
PH UR: 5.5 [PH] (ref 5–8)
PROT UR STRIP-MCNC: NEGATIVE MG/DL
RBC # UR STRIP: NEGATIVE /UL
SP GR UR: 1.02 (ref 1–1.03)
UROBILINOGEN UR QL: NORMAL

## 2023-12-11 NOTE — PROGRESS NOTES
Chief Complaint   Patient presents with    Routine Prenatal Visit     Bp check       HPI: 28 y.o.  at 36w4d doing good here for bp check   No HA, +FM, no lof no vb    Vitals:    23 0948   BP: 126/82   Weight: 129 kg (283 lb 9.6 oz)     Total weight gain for pregnancy:  7.53 kg (16 lb 9.6 oz)    Review of systems:   Gen: negative  CV:     negative  GI: negative  :   good fetal movement noted   MS:    negative  Neuro: denies headaches and visual changes   Pul: negative    A/P  1. Intrauterine pregnancy at 36w4d   2. Pregnancy Risk:  COMPLICATED        Diagnoses and all orders for this visit:    1. 36 weeks gestation of pregnancy (Primary)    2. Screening for blood or protein in urine  -     POC Urinalysis Dipstick    3. Obesity in pregnancy, antepartum    4. Maternal varicella, non-immune    5. History of gestational hypertension        Pre-eclampsia symptoms were discussed and warnings were given.  Routine labor warnings were discussed and indications for L & D f/u including bleeding, regular contractions, decreased fetal movement or/and rupture of membranes.   -----------------------  PLAN: BP today looks good no complaints follow up with dr beaver in 1 week  No follow-ups on file.      Yoanna Mccullough, APRN  2023 10:03 EST

## 2023-12-13 ENCOUNTER — ROUTINE PRENATAL (OUTPATIENT)
Dept: OBSTETRICS AND GYNECOLOGY | Age: 28
End: 2023-12-13
Payer: COMMERCIAL

## 2023-12-13 VITALS — WEIGHT: 287 LBS | DIASTOLIC BLOOD PRESSURE: 68 MMHG | BODY MASS INDEX: 43.64 KG/M2 | SYSTOLIC BLOOD PRESSURE: 132 MMHG

## 2023-12-13 DIAGNOSIS — O13.3 GESTATIONAL HYPERTENSION WITHOUT SIGNIFICANT PROTEINURIA IN THIRD TRIMESTER: ICD-10-CM

## 2023-12-13 DIAGNOSIS — O26.849 FETAL SIZE INCONSISTENT WITH DATES: ICD-10-CM

## 2023-12-13 DIAGNOSIS — O99.210 OBESITY IN PREGNANCY, ANTEPARTUM: ICD-10-CM

## 2023-12-13 DIAGNOSIS — Z34.03 ENCOUNTER FOR SUPERVISION OF NORMAL FIRST PREGNANCY IN THIRD TRIMESTER: Primary | ICD-10-CM

## 2023-12-13 DIAGNOSIS — Z3A.36 36 WEEKS GESTATION OF PREGNANCY: ICD-10-CM

## 2023-12-13 DIAGNOSIS — F11.20 METHADONE MAINTENANCE TREATMENT AFFECTING PREGNANCY IN SECOND TRIMESTER: ICD-10-CM

## 2023-12-13 DIAGNOSIS — O99.322 METHADONE MAINTENANCE TREATMENT AFFECTING PREGNANCY IN SECOND TRIMESTER: ICD-10-CM

## 2023-12-13 LAB
BILIRUB BLD-MCNC: NEGATIVE MG/DL
CLARITY, POC: CLEAR
COLOR UR: YELLOW
GLUCOSE UR STRIP-MCNC: NEGATIVE MG/DL
KETONES UR QL: NEGATIVE
LEUKOCYTE EST, POC: ABNORMAL
NITRITE UR-MCNC: NEGATIVE MG/ML
PH UR: 6.5 [PH] (ref 5–8)
PROT UR STRIP-MCNC: NEGATIVE MG/DL
RBC # UR STRIP: NEGATIVE /UL
SP GR UR: 1.02 (ref 1–1.03)
UROBILINOGEN UR QL: NORMAL

## 2023-12-13 NOTE — PROGRESS NOTES
Chief Complaint   Patient presents with    Routine Prenatal Visit     CC: Ob follow up, ob check 36w6d. No complaints today.       HPI: 28 y.o.  at 36w6d presents for regular OB check without complaints.  The pregnancy is complicated by obesity and size greater than dates.    Blood pressure is mildly elevated with normal repeat    Vitals:    23 0944   BP: 136/86   Weight: 130 kg (287 lb)     Total weight gain for pregnancy:  9.072 kg (20 lb)    Review of systems:   Gen: negative  CV:     negative  GI: negative  :   negative and good fetal movement noted   MS:    negative  Neuro: negative  Pul: negative    A/P  1. Intrauterine pregnancy at 36w6d   2. Pregnancy Risk:  NORMAL        Diagnoses and all orders for this visit:    1. Encounter for supervision of normal first pregnancy in third trimester (Primary)    2. 36 weeks gestation of pregnancy  -     POC Urinalysis Dipstick    3. Obesity in pregnancy, antepartum    4. Methadone maintenance treatment affecting pregnancy in second trimester    5. Fetal size inconsistent with dates  Comments:  Plan ultrasound to check interval growth    6. Gestational hypertension without significant proteinuria in third trimester  Comments:  No symptoms of preeclampsia and repeat blood pressure improved        Pre-eclampsia symptoms were discussed and warnings were given.   labor was discussed.  Warnings were provided.  -----------------------  PLAN:   Return in about 1 week (around 2023) for OB visit.      Lisandro Brown MD  2023 10:12 EST

## 2023-12-20 ENCOUNTER — ROUTINE PRENATAL (OUTPATIENT)
Dept: OBSTETRICS AND GYNECOLOGY | Age: 28
End: 2023-12-20
Payer: COMMERCIAL

## 2023-12-20 VITALS — WEIGHT: 288 LBS | BODY MASS INDEX: 43.79 KG/M2 | SYSTOLIC BLOOD PRESSURE: 130 MMHG | DIASTOLIC BLOOD PRESSURE: 84 MMHG

## 2023-12-20 DIAGNOSIS — Z34.00 SUPERVISION OF NORMAL FIRST PREGNANCY, ANTEPARTUM: ICD-10-CM

## 2023-12-20 DIAGNOSIS — Z3A.37 37 WEEKS GESTATION OF PREGNANCY: Primary | ICD-10-CM

## 2023-12-20 DIAGNOSIS — O99.210 OBESITY IN PREGNANCY, ANTEPARTUM: ICD-10-CM

## 2023-12-20 LAB
BILIRUB BLD-MCNC: NEGATIVE MG/DL
CLARITY, POC: CLEAR
COLOR UR: YELLOW
GLUCOSE UR STRIP-MCNC: NEGATIVE MG/DL
KETONES UR QL: NEGATIVE
LEUKOCYTE EST, POC: NEGATIVE
NITRITE UR-MCNC: NEGATIVE MG/ML
PH UR: 5.5 [PH] (ref 5–8)
PROT UR STRIP-MCNC: NEGATIVE MG/DL
RBC # UR STRIP: NEGATIVE /UL
SP GR UR: 1.01 (ref 1–1.03)
UROBILINOGEN UR QL: NORMAL

## 2023-12-20 NOTE — PROGRESS NOTES
Chief Complaint   Patient presents with    Routine Prenatal Visit     CC: Ob follow up, ob check 37w6d. No complaints today.         HPI: 28 y.o.  at 37w6d presents for regular OB check reporting some contractions.  The patient reports fetal movement and denies symptoms of preeclampsia    Vitals:    23 1100   BP: 130/84   Weight: 131 kg (288 lb)     Total weight gain for pregnancy:  9.526 kg (21 lb)    Review of systems:   Gen: negative  CV:     negative  GI: negative  :   good fetal movement noted , gabby espinosa type contractions, and pelvic pressure  MS:    negative  Neuro: negative  Pul: negative    A/P  1. Intrauterine pregnancy at 37w6d   2. Pregnancy Risk:  COMPLICATED        Diagnoses and all orders for this visit:    1. 37 weeks gestation of pregnancy (Primary)  -     POC Urinalysis Dipstick    2. Supervision of normal first pregnancy, antepartum    3. Obesity in pregnancy, antepartum        Pre-eclampsia symptoms were discussed and warnings were given.  Routine labor warnings were discussed and indications for L & D f/u including bleeding, regular contractions, decreased fetal movement or/and rupture of membranes.   -----------------------  PLAN:   Return in about 1 week (around 2023) for OB visit.      Lisandro Brown MD  2023 11:29 EST

## 2023-12-22 ENCOUNTER — ANESTHESIA (OUTPATIENT)
Dept: LABOR AND DELIVERY | Facility: HOSPITAL | Age: 28
End: 2023-12-22
Payer: COMMERCIAL

## 2023-12-22 ENCOUNTER — ANESTHESIA EVENT (OUTPATIENT)
Dept: LABOR AND DELIVERY | Facility: HOSPITAL | Age: 28
End: 2023-12-22
Payer: COMMERCIAL

## 2023-12-22 ENCOUNTER — HOSPITAL ENCOUNTER (INPATIENT)
Facility: HOSPITAL | Age: 28
LOS: 2 days | Discharge: HOME | End: 2023-12-24
Attending: OBSTETRICS & GYNECOLOGY | Admitting: OBSTETRICS & GYNECOLOGY
Payer: COMMERCIAL

## 2023-12-22 PROBLEM — O42.92 FULL-TERM PREMATURE RUPTURE OF MEMBRANES: Status: ACTIVE | Noted: 2023-12-22

## 2023-12-22 PROBLEM — Z34.90 PREGNANCY: Status: ACTIVE | Noted: 2023-12-22

## 2023-12-22 LAB
A1 MICROGLOB PLACENTAL VAG QL: POSITIVE
ABO GROUP BLD: NORMAL
ALBUMIN SERPL-MCNC: 3.3 G/DL (ref 3.5–5.2)
ALBUMIN/GLOB SERPL: 1 G/DL
ALP SERPL-CCNC: 187 U/L (ref 39–117)
ALT SERPL W P-5'-P-CCNC: 31 U/L (ref 1–33)
AMPHET+METHAMPHET UR QL: NEGATIVE
ANION GAP SERPL CALCULATED.3IONS-SCNC: 13.7 MMOL/L (ref 5–15)
ARTERIAL PATENCY WRIST A: ABNORMAL
AST SERPL-CCNC: 30 U/L (ref 1–32)
ATMOSPHERIC PRESS: 752.9 MMHG
BARBITURATES UR QL SCN: NEGATIVE
BASE EXCESS BLDA CALC-SCNC: -0.7 MMOL/L (ref 0–2)
BASOPHILS # BLD AUTO: 0.03 10*3/MM3 (ref 0–0.2)
BASOPHILS NFR BLD AUTO: 0.3 % (ref 0–1.5)
BDY SITE: ABNORMAL
BENZODIAZ UR QL SCN: NEGATIVE
BILIRUB SERPL-MCNC: 0.2 MG/DL (ref 0–1.2)
BILIRUB UR QL STRIP: NEGATIVE
BLD GP AB SCN SERPL QL: NEGATIVE
BUN SERPL-MCNC: 10 MG/DL (ref 6–20)
BUN/CREAT SERPL: 16.1 (ref 7–25)
CALCIUM SPEC-SCNC: 8.7 MG/DL (ref 8.6–10.5)
CANNABINOIDS SERPL QL: NEGATIVE
CHLORIDE SERPL-SCNC: 104 MMOL/L (ref 98–107)
CLARITY UR: CLEAR
CO2 BLDA-SCNC: 28.8 MMOL/L (ref 23–27)
CO2 SERPL-SCNC: 17.3 MMOL/L (ref 22–29)
COCAINE UR QL: NEGATIVE
COLOR UR: YELLOW
CREAT SERPL-MCNC: 0.62 MG/DL (ref 0.57–1)
DEPRECATED RDW RBC AUTO: 39.7 FL (ref 37–54)
EGFRCR SERPLBLD CKD-EPI 2021: 125.4 ML/MIN/1.73
EOSINOPHIL # BLD AUTO: 0.07 10*3/MM3 (ref 0–0.4)
EOSINOPHIL NFR BLD AUTO: 0.7 % (ref 0.3–6.2)
ERYTHROCYTE [DISTWIDTH] IN BLOOD BY AUTOMATED COUNT: 14.3 % (ref 12.3–15.4)
FENTANYL UR-MCNC: NEGATIVE NG/ML
GLOBULIN UR ELPH-MCNC: 3.2 GM/DL
GLUCOSE SERPL-MCNC: 111 MG/DL (ref 65–99)
GLUCOSE UR STRIP-MCNC: NEGATIVE MG/DL
HCO3 BLDA-SCNC: 27.1 MMOL/L (ref 22–28)
HCT VFR BLD AUTO: 28.9 % (ref 34–46.6)
HEMODILUTION: NO
HGB BLD-MCNC: 9.7 G/DL (ref 12–15.9)
HGB UR QL STRIP.AUTO: NEGATIVE
IMM GRANULOCYTES # BLD AUTO: 0.08 10*3/MM3 (ref 0–0.05)
IMM GRANULOCYTES NFR BLD AUTO: 0.9 % (ref 0–0.5)
INHALED O2 CONCENTRATION: 21 %
KETONES UR QL STRIP: NEGATIVE
LEUKOCYTE ESTERASE UR QL STRIP.AUTO: NEGATIVE
LYMPHOCYTES # BLD AUTO: 1.66 10*3/MM3 (ref 0.7–3.1)
LYMPHOCYTES NFR BLD AUTO: 17.7 % (ref 19.6–45.3)
Lab: ABNORMAL
MCH RBC QN AUTO: 26.3 PG (ref 26.6–33)
MCHC RBC AUTO-ENTMCNC: 33.6 G/DL (ref 31.5–35.7)
MCV RBC AUTO: 78.3 FL (ref 79–97)
METHADONE UR QL SCN: NEGATIVE
MODALITY: ABNORMAL
MONOCYTES # BLD AUTO: 0.46 10*3/MM3 (ref 0.1–0.9)
MONOCYTES NFR BLD AUTO: 4.9 % (ref 5–12)
NEUTROPHILS NFR BLD AUTO: 7.08 10*3/MM3 (ref 1.7–7)
NEUTROPHILS NFR BLD AUTO: 75.5 % (ref 42.7–76)
NITRITE UR QL STRIP: NEGATIVE
NOTIFIED WHO: ABNORMAL
NRBC BLD AUTO-RTO: 0 /100 WBC (ref 0–0.2)
O2 A-A PPRESDIFF RESPIRATORY: <0.2 MMHG
OPIATES UR QL: NEGATIVE
OXYCODONE UR QL SCN: NEGATIVE
PCO2 BLDA: 55.6 MM HG (ref 35–45)
PH BLDA: 7.29 PH UNITS (ref 7.35–7.45)
PH UR STRIP.AUTO: 5.5 [PH] (ref 5–8)
PLATELET # BLD AUTO: 352 10*3/MM3 (ref 140–450)
PMV BLD AUTO: 9.7 FL (ref 6–12)
PO2 BLDA: <14.4 MM HG (ref 80–100)
POTASSIUM SERPL-SCNC: 3.4 MMOL/L (ref 3.5–5.2)
PROT SERPL-MCNC: 6.5 G/DL (ref 6–8.5)
PROT UR QL STRIP: NEGATIVE
RBC # BLD AUTO: 3.69 10*6/MM3 (ref 3.77–5.28)
READ BACK: YES
RH BLD: POSITIVE
SODIUM SERPL-SCNC: 135 MMOL/L (ref 136–145)
SP GR UR STRIP: 1.02 (ref 1–1.03)
T&S EXPIRATION DATE: NORMAL
UROBILINOGEN UR QL STRIP: NORMAL
WBC NRBC COR # BLD AUTO: 9.38 10*3/MM3 (ref 3.4–10.8)

## 2023-12-22 PROCEDURE — 87086 URINE CULTURE/COLONY COUNT: CPT | Performed by: OBSTETRICS & GYNECOLOGY

## 2023-12-22 PROCEDURE — 80307 DRUG TEST PRSMV CHEM ANLYZR: CPT | Performed by: OBSTETRICS & GYNECOLOGY

## 2023-12-22 PROCEDURE — 86901 BLOOD TYPING SEROLOGIC RH(D): CPT | Performed by: OBSTETRICS & GYNECOLOGY

## 2023-12-22 PROCEDURE — 86850 RBC ANTIBODY SCREEN: CPT | Performed by: OBSTETRICS & GYNECOLOGY

## 2023-12-22 PROCEDURE — 85025 COMPLETE CBC W/AUTO DIFF WBC: CPT | Performed by: OBSTETRICS & GYNECOLOGY

## 2023-12-22 PROCEDURE — 86900 BLOOD TYPING SEROLOGIC ABO: CPT | Performed by: OBSTETRICS & GYNECOLOGY

## 2023-12-22 PROCEDURE — 59410 OBSTETRICAL CARE: CPT | Performed by: OBSTETRICS & GYNECOLOGY

## 2023-12-22 PROCEDURE — 84112 EVAL AMNIOTIC FLUID PROTEIN: CPT | Performed by: OBSTETRICS & GYNECOLOGY

## 2023-12-22 PROCEDURE — 25810000003 LACTATED RINGERS SOLUTION: Performed by: OBSTETRICS & GYNECOLOGY

## 2023-12-22 PROCEDURE — C1755 CATHETER, INTRASPINAL: HCPCS | Performed by: ANESTHESIOLOGY

## 2023-12-22 PROCEDURE — 0UQMXZZ REPAIR VULVA, EXTERNAL APPROACH: ICD-10-PCS | Performed by: OBSTETRICS & GYNECOLOGY

## 2023-12-22 PROCEDURE — 82803 BLOOD GASES ANY COMBINATION: CPT

## 2023-12-22 PROCEDURE — 99202 OFFICE O/P NEW SF 15 MIN: CPT | Performed by: OBSTETRICS & GYNECOLOGY

## 2023-12-22 PROCEDURE — 80053 COMPREHEN METABOLIC PANEL: CPT | Performed by: OBSTETRICS & GYNECOLOGY

## 2023-12-22 PROCEDURE — 81003 URINALYSIS AUTO W/O SCOPE: CPT | Performed by: OBSTETRICS & GYNECOLOGY

## 2023-12-22 PROCEDURE — 25810000003 LACTATED RINGERS PER 1000 ML: Performed by: OBSTETRICS & GYNECOLOGY

## 2023-12-22 RX ORDER — SODIUM CHLORIDE 9 MG/ML
40 INJECTION, SOLUTION INTRAVENOUS AS NEEDED
Status: DISCONTINUED | OUTPATIENT
Start: 2023-12-22 | End: 2023-12-22 | Stop reason: HOSPADM

## 2023-12-22 RX ORDER — OXYTOCIN/0.9 % SODIUM CHLORIDE 30/500 ML
0-30 PLASTIC BAG, INJECTION (ML) INTRAVENOUS
Status: DISCONTINUED | OUTPATIENT
Start: 2023-12-22 | End: 2023-12-22

## 2023-12-22 RX ORDER — SODIUM CHLORIDE 0.9 % (FLUSH) 0.9 %
10 SYRINGE (ML) INJECTION AS NEEDED
Status: DISCONTINUED | OUTPATIENT
Start: 2023-12-22 | End: 2023-12-22 | Stop reason: HOSPADM

## 2023-12-22 RX ORDER — TERBUTALINE SULFATE 1 MG/ML
0.25 INJECTION, SOLUTION SUBCUTANEOUS AS NEEDED
Status: DISCONTINUED | OUTPATIENT
Start: 2023-12-22 | End: 2023-12-22 | Stop reason: HOSPADM

## 2023-12-22 RX ORDER — METHYLERGONOVINE MALEATE 0.2 MG/ML
200 INJECTION INTRAVENOUS ONCE AS NEEDED
Status: DISCONTINUED | OUTPATIENT
Start: 2023-12-22 | End: 2023-12-22 | Stop reason: HOSPADM

## 2023-12-22 RX ORDER — HYDROCODONE BITARTRATE AND ACETAMINOPHEN 5; 325 MG/1; MG/1
1 TABLET ORAL EVERY 4 HOURS PRN
Status: DISCONTINUED | OUTPATIENT
Start: 2023-12-22 | End: 2023-12-24 | Stop reason: HOSPADM

## 2023-12-22 RX ORDER — MISOPROSTOL 200 UG/1
600 TABLET ORAL ONCE AS NEEDED
Status: DISCONTINUED | OUTPATIENT
Start: 2023-12-22 | End: 2023-12-24 | Stop reason: HOSPADM

## 2023-12-22 RX ORDER — OXYTOCIN/0.9 % SODIUM CHLORIDE 30/500 ML
999 PLASTIC BAG, INJECTION (ML) INTRAVENOUS ONCE
Status: DISCONTINUED | OUTPATIENT
Start: 2023-12-22 | End: 2023-12-22 | Stop reason: HOSPADM

## 2023-12-22 RX ORDER — HYDROCORTISONE 25 MG/G
1 CREAM TOPICAL AS NEEDED
Status: DISCONTINUED | OUTPATIENT
Start: 2023-12-22 | End: 2023-12-24 | Stop reason: HOSPADM

## 2023-12-22 RX ORDER — MAGNESIUM CARB/ALUMINUM HYDROX 105-160MG
30 TABLET,CHEWABLE ORAL ONCE AS NEEDED
Status: DISCONTINUED | OUTPATIENT
Start: 2023-12-22 | End: 2023-12-22 | Stop reason: HOSPADM

## 2023-12-22 RX ORDER — BISACODYL 10 MG
10 SUPPOSITORY, RECTAL RECTAL DAILY PRN
Status: DISCONTINUED | OUTPATIENT
Start: 2023-12-23 | End: 2023-12-24 | Stop reason: HOSPADM

## 2023-12-22 RX ORDER — MISOPROSTOL 200 UG/1
800 TABLET ORAL ONCE AS NEEDED
Status: DISCONTINUED | OUTPATIENT
Start: 2023-12-22 | End: 2023-12-22 | Stop reason: HOSPADM

## 2023-12-22 RX ORDER — EPHEDRINE SULFATE 50 MG/ML
50 INJECTION, SOLUTION INTRAVENOUS ONCE
Status: DISCONTINUED | OUTPATIENT
Start: 2023-12-22 | End: 2023-12-22

## 2023-12-22 RX ORDER — CARBOPROST TROMETHAMINE 250 UG/ML
250 INJECTION, SOLUTION INTRAMUSCULAR
Status: DISCONTINUED | OUTPATIENT
Start: 2023-12-22 | End: 2023-12-24 | Stop reason: HOSPADM

## 2023-12-22 RX ORDER — HYDROCODONE BITARTRATE AND ACETAMINOPHEN 10; 325 MG/1; MG/1
1 TABLET ORAL EVERY 4 HOURS PRN
Status: DISCONTINUED | OUTPATIENT
Start: 2023-12-22 | End: 2023-12-24 | Stop reason: HOSPADM

## 2023-12-22 RX ORDER — FAMOTIDINE 20 MG/1
20 TABLET, FILM COATED ORAL 2 TIMES DAILY PRN
Status: DISCONTINUED | OUTPATIENT
Start: 2023-12-22 | End: 2023-12-22 | Stop reason: HOSPADM

## 2023-12-22 RX ORDER — SODIUM CHLORIDE 0.9 % (FLUSH) 0.9 %
10 SYRINGE (ML) INJECTION EVERY 12 HOURS SCHEDULED
Status: DISCONTINUED | OUTPATIENT
Start: 2023-12-22 | End: 2023-12-22 | Stop reason: HOSPADM

## 2023-12-22 RX ORDER — EPHEDRINE SULFATE 50 MG/ML
10 INJECTION, SOLUTION INTRAVENOUS
Status: DISCONTINUED | OUTPATIENT
Start: 2023-12-22 | End: 2023-12-22 | Stop reason: HOSPADM

## 2023-12-22 RX ORDER — ACETAMINOPHEN 325 MG/1
650 TABLET ORAL EVERY 4 HOURS PRN
Status: DISCONTINUED | OUTPATIENT
Start: 2023-12-22 | End: 2023-12-22 | Stop reason: HOSPADM

## 2023-12-22 RX ORDER — TRANEXAMIC ACID 10 MG/ML
1000 INJECTION, SOLUTION INTRAVENOUS ONCE AS NEEDED
Status: DISCONTINUED | OUTPATIENT
Start: 2023-12-22 | End: 2023-12-22

## 2023-12-22 RX ORDER — DOCUSATE SODIUM 100 MG/1
100 CAPSULE, LIQUID FILLED ORAL 2 TIMES DAILY
Status: DISCONTINUED | OUTPATIENT
Start: 2023-12-22 | End: 2023-12-24 | Stop reason: HOSPADM

## 2023-12-22 RX ORDER — LIDOCAINE HYDROCHLORIDE 10 MG/ML
0.5 INJECTION, SOLUTION INFILTRATION; PERINEURAL ONCE AS NEEDED
Status: DISCONTINUED | OUTPATIENT
Start: 2023-12-22 | End: 2023-12-22 | Stop reason: HOSPADM

## 2023-12-22 RX ORDER — OXYTOCIN/0.9 % SODIUM CHLORIDE 30/500 ML
125 PLASTIC BAG, INJECTION (ML) INTRAVENOUS CONTINUOUS PRN
Status: DISCONTINUED | OUTPATIENT
Start: 2023-12-22 | End: 2023-12-24 | Stop reason: HOSPADM

## 2023-12-22 RX ORDER — SODIUM CHLORIDE, SODIUM LACTATE, POTASSIUM CHLORIDE, CALCIUM CHLORIDE 600; 310; 30; 20 MG/100ML; MG/100ML; MG/100ML; MG/100ML
125 INJECTION, SOLUTION INTRAVENOUS CONTINUOUS
Status: DISCONTINUED | OUTPATIENT
Start: 2023-12-22 | End: 2023-12-22

## 2023-12-22 RX ORDER — PRENATAL VIT/IRON FUM/FOLIC AC 27MG-0.8MG
1 TABLET ORAL DAILY
Status: DISCONTINUED | OUTPATIENT
Start: 2023-12-23 | End: 2023-12-24 | Stop reason: HOSPADM

## 2023-12-22 RX ORDER — FENTANYL CIT 0.2 MG/100ML-ROPIV 0.2%-NACL 0.9% EPIDURAL INJ 2/0.2 MCG/ML-%
8 SOLUTION INJECTION CONTINUOUS
Status: DISCONTINUED | OUTPATIENT
Start: 2023-12-22 | End: 2023-12-22

## 2023-12-22 RX ORDER — OXYTOCIN/0.9 % SODIUM CHLORIDE 30/500 ML
250 PLASTIC BAG, INJECTION (ML) INTRAVENOUS CONTINUOUS
Status: DISPENSED | OUTPATIENT
Start: 2023-12-22 | End: 2023-12-22

## 2023-12-22 RX ORDER — ONDANSETRON 4 MG/1
4 TABLET, ORALLY DISINTEGRATING ORAL EVERY 6 HOURS PRN
Status: DISCONTINUED | OUTPATIENT
Start: 2023-12-22 | End: 2023-12-22 | Stop reason: HOSPADM

## 2023-12-22 RX ORDER — FERROUS SULFATE 325(65) MG
325 TABLET ORAL
Status: DISCONTINUED | OUTPATIENT
Start: 2023-12-23 | End: 2023-12-24 | Stop reason: HOSPADM

## 2023-12-22 RX ORDER — SODIUM CHLORIDE 0.9 % (FLUSH) 0.9 %
1-10 SYRINGE (ML) INJECTION AS NEEDED
Status: DISCONTINUED | OUTPATIENT
Start: 2023-12-22 | End: 2023-12-24 | Stop reason: HOSPADM

## 2023-12-22 RX ORDER — ACETAMINOPHEN 325 MG/1
650 TABLET ORAL EVERY 6 HOURS PRN
Status: DISCONTINUED | OUTPATIENT
Start: 2023-12-22 | End: 2023-12-24 | Stop reason: HOSPADM

## 2023-12-22 RX ORDER — ONDANSETRON 2 MG/ML
4 INJECTION INTRAMUSCULAR; INTRAVENOUS EVERY 6 HOURS PRN
Status: DISCONTINUED | OUTPATIENT
Start: 2023-12-22 | End: 2023-12-22 | Stop reason: HOSPADM

## 2023-12-22 RX ORDER — TRANEXAMIC ACID 10 MG/ML
1000 INJECTION, SOLUTION INTRAVENOUS ONCE AS NEEDED
Status: DISCONTINUED | OUTPATIENT
Start: 2023-12-22 | End: 2023-12-24 | Stop reason: HOSPADM

## 2023-12-22 RX ORDER — IBUPROFEN 600 MG/1
600 TABLET ORAL EVERY 6 HOURS PRN
Status: DISCONTINUED | OUTPATIENT
Start: 2023-12-22 | End: 2023-12-24 | Stop reason: HOSPADM

## 2023-12-22 RX ORDER — FAMOTIDINE 10 MG/ML
20 INJECTION, SOLUTION INTRAVENOUS 2 TIMES DAILY PRN
Status: DISCONTINUED | OUTPATIENT
Start: 2023-12-22 | End: 2023-12-22 | Stop reason: HOSPADM

## 2023-12-22 RX ADMIN — EPHEDRINE SULFATE 20 MG: 50 INJECTION INTRAVENOUS at 10:24

## 2023-12-22 RX ADMIN — Medication: at 23:43

## 2023-12-22 RX ADMIN — BENZOCAINE 1 APPLICATION: 5.6 OINTMENT TOPICAL at 23:44

## 2023-12-22 RX ADMIN — DOCUSATE SODIUM 100 MG: 100 CAPSULE, LIQUID FILLED ORAL at 23:44

## 2023-12-22 RX ADMIN — Medication 8 ML/HR: at 10:30

## 2023-12-22 RX ADMIN — SODIUM CHLORIDE, POTASSIUM CHLORIDE, SODIUM LACTATE AND CALCIUM CHLORIDE 1000 ML: 600; 310; 30; 20 INJECTION, SOLUTION INTRAVENOUS at 08:35

## 2023-12-22 RX ADMIN — LIDOCAINE HYDROCHLORIDE 5 MG: 10; .005 INJECTION, SOLUTION EPIDURAL; INFILTRATION; INTRACAUDAL; PERINEURAL at 10:15

## 2023-12-22 RX ADMIN — Medication 8 ML/HR: at 16:50

## 2023-12-22 RX ADMIN — SODIUM CHLORIDE, POTASSIUM CHLORIDE, SODIUM LACTATE AND CALCIUM CHLORIDE 125 ML/HR: 600; 310; 30; 20 INJECTION, SOLUTION INTRAVENOUS at 10:33

## 2023-12-22 RX ADMIN — Medication 2 MILLI-UNITS/MIN: at 09:35

## 2023-12-22 RX ADMIN — IBUPROFEN 600 MG: 600 TABLET, FILM COATED ORAL at 23:44

## 2023-12-22 RX ADMIN — SODIUM CHLORIDE, POTASSIUM CHLORIDE, SODIUM LACTATE AND CALCIUM CHLORIDE 125 ML/HR: 600; 310; 30; 20 INJECTION, SOLUTION INTRAVENOUS at 14:11

## 2023-12-22 RX ADMIN — EPHEDRINE SULFATE 20 MG: 50 INJECTION INTRAVENOUS at 10:26

## 2023-12-22 NOTE — OBED NOTES
[facility] Note OB        Patient Name: Alicia Bailey  YOB: 1995  MRN: [id number]  Admission Date: 2023  7:08 AM  Date of Service: 2023    Chief Complaint: Rupture of Membranes (Pt to [facility] with c/o SROM at 0500.  Pt has noticed continued leaking and cramping since then.  Pt denies vaginal bleeding.  Pt states that she was 3 cm in the office on Wednesday.)        Subjective     Alicia Bailey is a 28 y.o. female  at 38w1d with Estimated Date of Delivery: 24 who presents with the chief complaint listed above.  She sees Lisandro Brown MD for her prenatal care. Her pregnancy has been complicated by:   obesity, varicella non-immune, history of opioid use disorder on methadone maintenance .          She describes fetal movement as normal.  She has rupture of membranes.  She denies vaginal bleeding. She is feeling contractions.          Objective   Patient Active Problem List    Diagnosis     Nausea and vomiting in pregnancy [O21.9]     Methadone maintenance treatment affecting pregnancy [O99.320, F11.20]     Supervision of normal pregnancy [Z34.90]     Maternal varicella, non-immune [O09.899, Z28.39]     Obesity in pregnancy, antepartum [O99.210]     History of gestational hypertension [Z87.59]         OB History    Para Term  AB Living   2 1 1 0 0 1   SAB IAB Ectopic Molar Multiple Live Births   0 0 0 0 0 1      # Outcome Date GA Lbr José Miguel/2nd Weight Sex Delivery Anes PTL Lv   2 Current            1 Term 18 39w4d   F Vag-Spont EPI  PIETRO      Name: María        Past Medical History:   Diagnosis Date    Anxiety     IBS (irritable bowel syndrome)     Methadone use 2023    hx of opiate use; no methadone or opiate use since 2023       Past Surgical History:   Procedure Laterality Date    COLONOSCOPY         No current facility-administered medications on file prior to encounter.     No current outpatient medications on file prior to encounter.        Allergies   Allergen Reactions    Aspirin Hives and Rash       Family History   Problem Relation Age of Onset    Hypertension Father     Heart attack Father     Heart attack Mother     Diabetes Mother     Thyroid disease Mother     Seizures Mother     Breast cancer Sister 27        Currently in remission 6/2023    Seizures Paternal Grandmother     Diabetes Paternal Grandmother     Colon cancer Maternal Grandmother     Hypertension Maternal Grandfather     Diabetes Maternal Grandfather        Social History     Socioeconomic History    Marital status:    Tobacco Use    Smoking status: Former     Types: Cigarettes    Smokeless tobacco: Never   Vaping Use    Vaping Use: Every day    Substances: Nicotine   Substance and Sexual Activity    Alcohol use: Not Currently    Drug use: Not Currently     Types: Heroin     Comment: snorting; no IV drug use ever- sober since 07/01/2023    Sexual activity: Yes     Partners: Male     Birth control/protection: None           Review of Systems   Constitutional:  Negative for chills, fatigue and fever.   HENT:  Negative for congestion, rhinorrhea and sore throat.    Eyes:  Negative for visual disturbance.   Respiratory: Negative.     Cardiovascular: Negative.    Gastrointestinal:  Positive for abdominal pain. Negative for constipation, diarrhea, nausea and vomiting.   Genitourinary:  Positive for vaginal discharge. Negative for difficulty urinating, dyspareunia, dysuria, flank pain, frequency, genital sores, hematuria, pelvic pain, urgency, vaginal bleeding and vaginal pain.   Neurological:  Negative for dizziness, seizures, light-headedness and headaches.   Psychiatric/Behavioral:  Negative for sleep disturbance. The patient is not nervous/anxious.           PHYSICAL EXAM:      VITAL SIGNS:  There were no vitals filed for this visit.     FHT'S:                   Baseline:  135 BPM  Variability:  Moderate = 6 - 25 BPM  Accelerations:  15 x 15 accelerations present      Decelerations:  absent  Contractions:   absent     Interpretation:    Reactive NST, CAT 1 tracing        PHYSICAL EXAM:    General: well developed; well nourished  no acute distress   Heart: Not performed.   Lungs  : breathing is unlabored     Abdomen: soft, non-tender; no masses  no umbilical or inguinal hernias are present  no hepato-splenomegaly       Cervix: was checked (by RN): 3 cm / 60 % / -2      Contractions: irregular        Extremities: peripheral pulses normal, no pedal edema, no clubbing or cyanosis      LABS AND TESTING ORDERED:  Uterine and fetal monitoring  Urinalysis  ROM plus    LAB RESULTS:    No results found for this or any previous visit (from the past 24 hour(s)).    Lab Results   Component Value Date    ABO O 2023    RH Positive 2023       Lab Results   Component Value Date    STREPGPB Negative 2023                 Assessment & Plan     ASSESSMENT/PLAN:  Alicia Bailey is a 28 y.o. female  at 38w1d who presented with: concern for rupture of membranes.  ROM plus returned positive and patient was admitted for augmentation and delivery.          Final Impression:  Pregnancy at 38w1d  Reactive NST.  CAT 1 tracing  Maternal vital signs were reviewed and were unremarkable            There were no vitals filed for this visit.    Lab Results   Component Value Date    STREPGPB Negative 2023     Lab Results   Component Value Date    ABO O 2023    RH Positive 2023     COVID - 19 status unknown      PLAN:       I have spent 30 minutes including face to face time with the patient, greater than 50% in discussion of the diagnosis (counseling) and/or coordination of care.     Kailey Jean-Baptiste MD  2023  07:29 EST  OB Hospitalist  Phone:  [phone]

## 2023-12-22 NOTE — PROGRESS NOTES
"[facility]  Obstetric Progress Note    Subjective     Patient:    The patient feels comfortable. She had epidural placed recently. She denies painful ctx prior to epidural placement.     Objective     Vital Signs Range for the last 24 hours  Temp:  [98 °F (36.7 °C)-98.1 °F (36.7 °C)] 98.1 °F (36.7 °C)   Temp src: Oral   BP: ()/(36-99) 120/73   Heart Rate:  [] 124   Resp:  [17] 17   SpO2:  [96 %-100 %] 98 %           Weight:  [133 kg (292 lb 6.4 oz)] 133 kg (292 lb 6.4 oz)       Flowsheet Rows      Flowsheet Row First Filed Value   Admission Height 172.7 cm (68\") Documented at 12/22/2023 0729   Admission Weight 133 kg (292 lb 6.4 oz) Documented at 12/22/2023 0729            Intake/Output last 24 hours:    No intake or output data in the 24 hours ending 12/22/23 1128    Intake/Output this shift:    No intake/output data recorded.    Physical Exam:  General: Patient is comfortable and in no acute distress                     Presentation: vtx   Cervix: Exam by: Method: sterile exam per physician   Dilation: Cervical Dilation (cm): 4-5   Effacement: Cervical Effacement: 60%   Station:  -2   IUPC requested by RN to assist with ctx monitoring. Verbal consent obtained and IUPC placed without issue      Fetal Heart Rate Assessment   Method: Fetal HR Assessment Method: external   Beats/min: Fetal HR (beats/min): 135   Baseline:     Variability: Fetal HR Variability: moderate (amplitude range 6 to 25 bpm)   Accels: Fetal HR Accelerations: greater than/equal to 15 bpm, lasting at least 15 seconds   Decels: Fetal HR Decelerations: absent   Tracing Category:       Uterine Assessment   Method: Method: palpation, external tocotransducer   Frequency (min): Contraction Frequency (Minutes): x1   Ctx Count in 10 min:     Duration:     Intensity: Contraction Intensity: mild by palpation   Intensity by IUPC:     Resting Tone: Uterine Resting Tone: soft by palpation   Resting Tone by IUPC:     Conroy Units:   "       Assessment & Plan       Pregnancy    Obesity in pregnancy, antepartum    History of gestational hypertension    Methadone maintenance treatment affecting pregnancy    Full-term premature rupture of membranes        Assessment:  1.  Intrauterine pregnancy at 38w1d gestation with reactive fetal status.    2.  induction of labor  for PROM  with favorable cervix  3.  Obstetrical history significant for  prior vag delivery; pt denies complications during delivery .  4.  GBS status:   Strep Gp B Culture   Date Value Ref Range Status   2023 Negative Negative Final     Comment:     Centers for Disease Control and Prevention (CDC) and American Congress  of Obstetricians and Gynecologists (ACOG) guidelines for prevention of   group B streptococcal (GBS) disease specify co-collection of  a vaginal and rectal swab specimen to maximize sensitivity of GBS  detection. Per the CDC and ACOG, swabbing both the lower vagina and  rectum substantially increases the yield of detection compared with  sampling the vagina alone.  Penicillin G, ampicillin, or cefazolin are indicated for intrapartum  prophylaxis of  GBS colonization. Reflex susceptibility  testing should be performed prior to use of clindamycin only on GBS  isolates from penicillin-allergic women who are considered a high risk  for anaphylaxis. Treatment with vancomycin without additional testing  is warranted if resistance to clindamycin is noted.         Plan:  1. Continue pitocin augmentation  2. Plan of care has been reviewed with patient and partner  3.  Risks, benefits of treatment plan have been discussed.  4.  All questions have been answered.        Graciela Jimenez MD  2023  11:28 EST

## 2023-12-22 NOTE — PLAN OF CARE
Goal Outcome Evaluation:  Plan of Care Reviewed With: patient, spouse        Progress: improving  Outcome Evaluation: 38.1 augmentation of labor. SROM at 0500, clear fluid. Cervix currently 6/70-80/-2. pitocin infusing at 20mu/hr. VSS, pt afebrile. FHR reactive. Pt resting comfortably with epidural in place.

## 2023-12-22 NOTE — H&P
[facility]  Obstetric History and Physical    Chief Complaint   Patient presents with    Rupture of Membranes     Pt to [facility] with c/o SROM at 0500.  Pt has noticed continued leaking and cramping since then.  Pt denies vaginal bleeding.  Pt states that she was 3 cm in the office on Wednesday.       Subjective     Patient is a 28 y.o. female  currently at 38w1d, who presents with rupture of membranes with clear fluid. She is feeling fetal movement. She denies vaginal bleeding or regular contractions. She denies chest pain or shortness of air. She denies headache, abd pain or vision changes.     Her prenatal care is complicated by .  Patient Active Problem List   Diagnosis    Obesity in pregnancy, antepartum    History of gestational hypertension    Supervision of normal pregnancy    Maternal varicella, non-immune    Methadone maintenance treatment affecting pregnancy    Nausea and vomiting in pregnancy    Pregnancy    Full-term premature rupture of membranes     Her previous obstetric/gynecological history is noted for prior vaginal delivery. Pt denies any complications with delivery except difficulty with epidural placement and gestational HTN.     The following portions of the patients history were reviewed and updated as appropriate: current medications, allergies, past medical history, past surgical history, past family history, past social history, and problem list .       Prenatal Information:  Prenatal Results       Initial Prenatal Labs       Test Value Reference Range Date Time    Hemoglobin  13.1 g/dL 11.1 - 15.9 23 1443    Hematocrit  38.3 % 34.0 - 46.6 23 1443    Platelets  322 x10E3/uL 150 - 450 23 1443    Rubella IgG  1.84 index Immune >0.99 23 1443    Hepatitis B SAg  Negative  Negative 23 1443    Hepatitis C Ab  Non Reactive  Non Reactive 23 1443    RPR  Non Reactive  Non Reactive 23 1443    T. Pallidum Ab         ABO  O   23 1443    Rh   Positive   06/05/23 1443    Antibody Screen  Negative  Negative 06/05/23 1443    HIV  Non Reactive  Non Reactive 06/05/23 1443    Urine Culture  25,000 CFU/mL Normal Urogenital Ethel   11/26/23 1554       Final report   06/05/23 1443    Gonorrhea  Negative  Negative 06/05/23 1625    Chlamydia  Negative  Negative 06/05/23 1625    TSH  0.501 uIU/mL 0.450 - 4.500 06/05/23 1443    HgB A1c   5.5 % 4.8 - 5.6 06/05/23 1443    Varicella IgG  <135 index Immune >165 06/05/23 1443    HgB Electrophoresis         Cystic fibrosis                   Fetal testing        Test Value Reference Range Date Time    NIPT        MSAFP        AFP-4                  2nd and 3rd Trimester       Test Value Reference Range Date Time    Hemoglobin (repeated)  9.7 g/dL 12.0 - 15.9 12/22/23 0837       10.5 g/dL 12.0 - 15.9 11/26/23 1600       10.7 g/dL 11.1 - 15.9 10/16/23 1217    Hematocrit (repeated)  28.9 % 34.0 - 46.6 12/22/23 0837       31.4 % 34.0 - 46.6 11/26/23 1600       32.2 % 34.0 - 46.6 10/16/23 1217    Platelets   352 10*3/mm3 140 - 450 12/22/23 0837       335 10*3/mm3 140 - 450 11/26/23 1600       403 x10E3/uL 150 - 450 10/16/23 1217       322 x10E3/uL 150 - 450 06/05/23 1443    GCT  105 mg/dL 70 - 139 10/16/23 1217    Antibody Screen (repeated)  Negative  Negative 06/05/23 1443    Third Trimester syphilis screen (repeated)   Non Reactive  Non Reactive 06/05/23 1443    GTT Fasting        GTT 1 Hr        GTT 2 Hr        GTT 3 Hr        Group B Strep  Negative  Negative 12/07/23 1504              Other testing        Test Value Reference Range Date Time    Parvo IgG         CMV IgG                   Drug Screening       Test Value Reference Range Date Time    Amphetamine Screen        Barbiturate Screen  Negative  Negative 11/26/23 1554    Benzodiazepine Screen  Negative  Negative 11/26/23 1554    Methadone Screen  Negative  Negative 11/26/23 1554    Phencyclidine Screen        Opiates Screen  Negative  Negative 11/26/23 1554    THC  Screen  Negative  Negative 11/26/23 1554    Cocaine Screen  Negative  Negative 11/26/23 1554    Propoxyphene Screen        Buprenorphine Screen        Methamphetamine Screen        Oxycodone Screen  Negative  Negative 11/26/23 1554    Tricyclic Antidepressants Screen                  Legend    ^: Historical                          External Prenatal Results       Pregnancy Outside Results - Transcribed From Office Records - See Scanned Records For Details       Test Value Date Time    ABO  O  06/05/23 1443    Rh  Positive  06/05/23 1443    Antibody Screen  Negative  06/05/23 1443    Varicella IgG  <135 index 06/05/23 1443    Rubella  1.84 index 06/05/23 1443    Hgb  9.7 g/dL 12/22/23 0837       10.5 g/dL 11/26/23 1600       10.7 g/dL 10/16/23 1217       13.1 g/dL 06/05/23 1443    Hct  28.9 % 12/22/23 0837       31.4 % 11/26/23 1600       32.2 % 10/16/23 1217       38.3 % 06/05/23 1443    Glucose Fasting GTT       Glucose Tolerance Test 1 hour       Glucose Tolerance Test 3 hour       Gonorrhea (discrete)  Negative  06/05/23 1625    Chlamydia (discrete)  Negative  06/05/23 1625    RPR  Non Reactive  06/05/23 1443    VDRL       Syphilis Antibody       HBsAg  Negative  06/05/23 1443    Herpes Simplex Virus PCR       Herpes Simplex VIrus Culture       HIV  Non Reactive  06/05/23 1443    Hep C RNA Quant PCR       Hep C Antibody  Non Reactive  06/05/23 1443    AFP       Group B Strep  Negative  12/07/23 1504    GBS Susceptibility to Clindamycin       GBS Susceptibility to Erythromycin       Fetal Fibronectin       Genetic Testing, Maternal Blood                 Drug Screening       Test Value Date Time    Urine Drug Screen       Amphetamine Screen       Barbiturate Screen  Negative  11/26/23 1554    Benzodiazepine Screen  Negative  11/26/23 1554    Methadone Screen  Negative  11/26/23 1554    Phencyclidine Screen       Opiates Screen  Negative  11/26/23 1554    THC Screen  Negative  11/26/23 1554    Cocaine Screen        Propoxyphene Screen       Buprenorphine Screen       Methamphetamine Screen       Oxycodone Screen  Negative  23 1554    Tricyclic Antidepressants Screen                 Legend    ^: Historical                             Past OB History:     OB History    Para Term  AB Living   2 1 1 0 0 1   SAB IAB Ectopic Molar Multiple Live Births   0 0 0 0 0 1      # Outcome Date GA Lbr José Miguel/2nd Weight Sex Delivery Anes PTL Lv   2 Current            1 Term 18 39w4d   F Vag-Spont EPI  PIETRO      Name: María       Past Medical History: Past Medical History:   Diagnosis Date    Anxiety     IBS (irritable bowel syndrome)     Methadone use 2023    hx of opiate use; no methadone or opiate use since 2023      Past Surgical History Past Surgical History:   Procedure Laterality Date    COLONOSCOPY      TONSILLECTOMY      WISDOM TOOTH EXTRACTION        Family History: Family History   Problem Relation Age of Onset    Hypertension Father     Heart attack Father     Heart attack Mother     Diabetes Mother     Thyroid disease Mother     Seizures Mother     Breast cancer Sister 27        Currently in remission 2023    Seizures Paternal Grandmother     Diabetes Paternal Grandmother     Colon cancer Maternal Grandmother     Hypertension Maternal Grandfather     Diabetes Maternal Grandfather       Social History:  reports that she quit smoking about 7 months ago. Her smoking use included cigarettes. She has never used smokeless tobacco.   reports that she does not currently use alcohol.   reports that she does not currently use drugs after having used the following drugs: Heroin.        General ROS: Pertinent items are noted in HPI    Objective       Vital Signs Range for the last 24 hours  Temperature: Temp:  [98 °F (36.7 °C)] 98 °F (36.7 °C)   Temp Source: Temp src: Oral   BP: BP: (136-137)/(87-99) 136/87   Pulse: Heart Rate:  [101-113] 113   Respirations: Resp:  [17] 17   SPO2:     O2 Amount (l/min):      O2 Devices     Weight: Weight:  [133 kg (292 lb 6.4 oz)] 133 kg (292 lb 6.4 oz)     Physical Examination: General appearance - alert, well appearing, and in no distress  Mental status - alert, oriented to person, place, and time  Chest - clear to auscultation, no wheezes, rales or rhonchi, symmetric air entry  Heart - normal rate and regular rhythm  Abdomen - soft, gravid, nontender  Neurological - alert, oriented, normal speech, no focal findings or movement disorder noted  Extremities - bilateral lower ext with trace edema; no cords or tenderness  Skin - normal coloration and turgor    Presentation:    Cervix: Exam by: Method: sterile exam per RN   Dilation: Cervical Dilation (cm): 4   Effacement: Cervical Effacement: 50%   Station:         Fetal Heart Rate Assessment   Reactive    Uterine Assessment   No regular ctx noted        Assessment & Plan       Pregnancy    Obesity in pregnancy, antepartum    History of gestational hypertension    Methadone maintenance treatment affecting pregnancy    Full-term premature rupture of membranes        Assessment:  1.  Intrauterine pregnancy at 38w1d gestation with reactive fetal status.    2.  Rupture of membranes: recommend pitocin augmentation and pt agrees. Anesthesia consult has been ordered due to history. Pt desires epidural if possible for labor pain control.  3.  Obstetrical history significant for prior vaginal delivery.   4.  GBS status:   Strep Gp B Culture   Date Value Ref Range Status   2023 Negative Negative Final     Comment:     Centers for Disease Control and Prevention (CDC) and American Congress  of Obstetricians and Gynecologists (ACOG) guidelines for prevention of   group B streptococcal (GBS) disease specify co-collection of  a vaginal and rectal swab specimen to maximize sensitivity of GBS  detection. Per the CDC and ACOG, swabbing both the lower vagina and  rectum substantially increases the yield of detection compared with  sampling  the vagina alone.  Penicillin G, ampicillin, or cefazolin are indicated for intrapartum  prophylaxis of  GBS colonization. Reflex susceptibility  testing should be performed prior to use of clindamycin only on GBS  isolates from penicillin-allergic women who are considered a high risk  for anaphylaxis. Treatment with vancomycin without additional testing  is warranted if resistance to clindamycin is noted.         Plan:  1. Pitocin augmentation, anesthesia consultation  2. Plan of care has been reviewed with patient and her mother  3.  Risks, benefits of treatment plan have been discussed.  4.  All questions have been answered.        Graciela Jimenez MD  2023  09:27 EST

## 2023-12-22 NOTE — ANESTHESIA PROCEDURE NOTES
Labor Epidural      Patient location during procedure: OB  Start Time: 12/22/2023 9:54 AM  Stop Time: 12/22/2023 1:04 PM  Performed By  Anesthesiologist: Chris Morales MD  Preanesthetic Checklist  Completed: patient identified, IV checked, site marked, risks and benefits discussed, surgical consent, monitors and equipment checked, pre-op evaluation and timeout performed  Prep:  Pt Position:sitting  Sterile Tech:cap, gloves, mask and sterile barrier  Prep:povidone-iodine 7.5% surgical scrub  Monitoring:blood pressure monitoring, continuous pulse oximetry and EKG  Epidural Block Procedure:  Approach:midline  Guidance:landmark technique and palpation technique  Location:L1-L2  Needle Type:Tuohy  Needle Gauge:17  Loss of Resistance Medium: air  Paresthesia: none  Aspiration:negative  Test Dose:negative  Number of Attempts: 1  Post Assessment:  Dressing:occlusive dressing applied and secured with tape  Pt Tolerance:patient tolerated the procedure well with no apparent complications  Complications:no

## 2023-12-22 NOTE — ANESTHESIA PREPROCEDURE EVALUATION
Anesthesia Evaluation     Patient summary reviewed and Nursing notes reviewed   NPO Solid Status: > 8 hours  NPO Liquid Status: > 4 hours           Airway   Mallampati: II  Neck ROM: full  No difficulty expected  Dental - normal exam     Pulmonary     breath sounds clear to auscultation  (+) a smoker Former,  Cardiovascular     Rhythm: regular        Neuro/Psych  (+) psychiatric history  GI/Hepatic/Renal/Endo    (+) obesity, morbid obesity    ROS Comment: BMI 44.6, 292 lbs    Musculoskeletal     Abdominal   (+) obese   Substance History      OB/GYN    (+) Pregnant        Other                    Anesthesia Plan    ASA 3     epidural     (  38w1d  Previous pregnancy epidural attempted by 2 separate anesthesiologists without success, spinal placed at delivery instead)    Anesthetic plan, risks, benefits, and alternatives have been provided, discussed and informed consent has been obtained with: patient.    CODE STATUS:    Level Of Support Discussed With: Patient  Code Status (Patient has no pulse and is not breathing): CPR (Attempt to Resuscitate)  Medical Interventions (Patient has pulse or is breathing): Full Support

## 2023-12-23 LAB
BACTERIA SPEC AEROBE CULT: NORMAL
BASOPHILS # BLD AUTO: 0.03 10*3/MM3 (ref 0–0.2)
BASOPHILS NFR BLD AUTO: 0.3 % (ref 0–1.5)
DEPRECATED RDW RBC AUTO: 41.9 FL (ref 37–54)
EOSINOPHIL # BLD AUTO: 0.06 10*3/MM3 (ref 0–0.4)
EOSINOPHIL NFR BLD AUTO: 0.5 % (ref 0.3–6.2)
ERYTHROCYTE [DISTWIDTH] IN BLOOD BY AUTOMATED COUNT: 14.4 % (ref 12.3–15.4)
HCT VFR BLD AUTO: 31.2 % (ref 34–46.6)
HGB BLD-MCNC: 10.2 G/DL (ref 12–15.9)
IMM GRANULOCYTES # BLD AUTO: 0.05 10*3/MM3 (ref 0–0.05)
IMM GRANULOCYTES NFR BLD AUTO: 0.4 % (ref 0–0.5)
LYMPHOCYTES # BLD AUTO: 2.22 10*3/MM3 (ref 0.7–3.1)
LYMPHOCYTES NFR BLD AUTO: 19 % (ref 19.6–45.3)
MCH RBC QN AUTO: 26.4 PG (ref 26.6–33)
MCHC RBC AUTO-ENTMCNC: 32.7 G/DL (ref 31.5–35.7)
MCV RBC AUTO: 80.6 FL (ref 79–97)
MONOCYTES # BLD AUTO: 0.65 10*3/MM3 (ref 0.1–0.9)
MONOCYTES NFR BLD AUTO: 5.6 % (ref 5–12)
NEUTROPHILS NFR BLD AUTO: 74.2 % (ref 42.7–76)
NEUTROPHILS NFR BLD AUTO: 8.67 10*3/MM3 (ref 1.7–7)
NRBC BLD AUTO-RTO: 0.1 /100 WBC (ref 0–0.2)
PLATELET # BLD AUTO: 349 10*3/MM3 (ref 140–450)
PMV BLD AUTO: 9.5 FL (ref 6–12)
RBC # BLD AUTO: 3.87 10*6/MM3 (ref 3.77–5.28)
WBC NRBC COR # BLD AUTO: 11.68 10*3/MM3 (ref 3.4–10.8)

## 2023-12-23 PROCEDURE — 85025 COMPLETE CBC W/AUTO DIFF WBC: CPT | Performed by: OBSTETRICS & GYNECOLOGY

## 2023-12-23 RX ADMIN — DOCUSATE SODIUM 100 MG: 100 CAPSULE, LIQUID FILLED ORAL at 20:19

## 2023-12-23 RX ADMIN — IBUPROFEN 600 MG: 600 TABLET, FILM COATED ORAL at 20:19

## 2023-12-23 RX ADMIN — IBUPROFEN 600 MG: 600 TABLET, FILM COATED ORAL at 06:37

## 2023-12-23 RX ADMIN — Medication 1 TABLET: at 09:57

## 2023-12-23 RX ADMIN — ACETAMINOPHEN 650 MG: 325 TABLET, FILM COATED ORAL at 09:57

## 2023-12-23 RX ADMIN — ACETAMINOPHEN 650 MG: 325 TABLET, FILM COATED ORAL at 01:05

## 2023-12-23 RX ADMIN — IBUPROFEN 600 MG: 600 TABLET, FILM COATED ORAL at 14:16

## 2023-12-23 RX ADMIN — ACETAMINOPHEN 650 MG: 325 TABLET, FILM COATED ORAL at 16:04

## 2023-12-23 RX ADMIN — FERROUS SULFATE TAB 325 MG (65 MG ELEMENTAL FE) 325 MG: 325 (65 FE) TAB at 09:57

## 2023-12-23 RX ADMIN — DOCUSATE SODIUM 100 MG: 100 CAPSULE, LIQUID FILLED ORAL at 09:57

## 2023-12-23 NOTE — L&D DELIVERY NOTE
[facility]   Vaginal Delivery Note    Patient Name: Alicia Bailey  : 1995  MRN: [id number]    Date of Delivery: 2023     Diagnosis     Pre & Post-Delivery:  Intrauterine pregnancy at 38w1d  Labor status: Spontaneous Onset of Labor     Pregnancy    Obesity in pregnancy, antepartum    History of gestational hypertension    Methadone maintenance treatment affecting pregnancy    Full-term premature rupture of membranes    Vaginal delivery    Shoulder dystocia during labor and delivery             Problem List    Transfer to Postpartum     Review the Delivery Report for details.     Delivery     Delivery: Vaginal, Spontaneous     YOB: 2022    Time of Birth:  Gestational Age 7:24 PM   38w1d     Anesthesia: Epidural     Delivering clinician: Graciela Jimenez MD   Forceps?   No   Vacuum? No    Shoulder dystocia present: Yes Shoulder Dystocia Details  Dystocia Present? Yes   Time head delivered:    Gentle attempt at traction, assisted by maternal expulsive forces: Yes yes  If no, why:    Anterior shoulder:  left  Time recognized: 2023  7:23 PM     Time help called:   Called by: already at bedside    Time provider arrived:   Provider who arrived:     Time NICU arrived:   NICU staff:     Time additional staff arrived:   Additional staff:            Delivery narrative:  28 year old G 2 P 1001 admitted for rupture of membranes at term with clear fluid at 0500 this AM. She had reassuring fetal heart tones and received pitocin for labor augmentation. She had epidural placed for pain control. She progressed to second stage and pushed 30 minutes to spontaneous vaginal delivery. The vertex delivered OA and a loose nuchal cord was noted and easily reduced. A shoulder dystocia was then encountered. McRobert's maneuver was requested and performed. Suprapubic pressure was requested and performed resulting in resolution of the dystocia in 20 seconds. The infant appeared stable and was placed on  "mother's chest for kangaroo care. He was moving extremities well. The umbilical cord was clamped and cut by the patient's mother after 30 seconds. The placenta delivered spontaneously and appeared intact. Manual exploration of the uterus did not reveal retained products. The cervix and vagina appeared intact. A first degree laceration at the introitus and a right labial laceration were repaired. All counts were correct following.       Infant     Findings: male  infant     Infant observations: Weight: No birth weight on file.   Length:   in  Observations/Comments:        Apgars:   @ 1 minute /      @ 5 minutes   Infant Name: Domingo     Placenta & Cord         Placenta delivered  Spontaneous  at   12/22/2023  7:28 PM     Cord: 3 vessels  present.   Nuchal Cord?  yes; Number of nuchal loops present:  1    Cord blood obtained: Yes    Cord gases obtained:  Yes    Cord gas results:     Arterial:  No results found for: \"PHCART\", \"BECART\"     Repair     Episiotomy: None     No    Lacerations: Yes  Laceration Information  Laceration Repaired?   Perineal: 1st  Yes    Periurethral:       Labial: right  Yes    Sulcus:       Vaginal:       Cervical:         Suture used for repair: 3-0, 4-0 Vicryl and 3-0 chromic     Estimated Blood Loss:  300 cc     Quantitative Blood Loss: Quantitative Blood Loss (mL): 138 mL (12/22/23 1928)        Complications     Shoulder dystocia    Disposition     Mother to Mother Baby/Postpartum  in stable condition currently.  Baby to NBN  in stable condition currently.    Graciela Jimenez MD  12/22/23  20:09 EST        "

## 2023-12-23 NOTE — PROGRESS NOTES
"Discharge Planning Assessment  [facility]     Patient Name: Alicia Bailey  MRN: [id number]  Today's Date: 12/23/2023    Admit Date: 12/22/2023    Plan: Infant may discharge to mother when medically ready. CSW will follow cord tox. GUTIERREZ PalomaresW   Discharge Needs Assessment    No documentation.                  Discharge Plan       Row Name 12/23/23 1531       Plan    Plan Infant may discharge to mother when medically ready. CSW will follow cord tox. GUTIERREZ PalomaresW    Plan Comments Mother: Alicia Bailey, MRN [id number]; Infant: Rigo \"Domingo\" Lynn, MRN [id number]. CSW was consulted for \"hx of drug use\". Of note, mother's UDS was negative prenatally on 11/26 and negative on admission 12/22; infant's UDS was missed and cord toxicology has been sent. CSW met with mother and father of infant/spouse at bedside; mother gave consent for CSW to continue the assessment with FOB in the room. Mother verified her address, phone number and insurance. A [employer] rep has met with mother to have infant added to Medicaid. Mother reports having a car seat, crib/bassinet, clothes, and diapers for infant. This is mother and father's first child; mother has a 7 year old daughter from a previous relationship, and FOB has an 8 year old daughter from a previous relationship. Parent's other children are with their other parents during mother's hospital stay. Mother's support system includes FOB/spouse, maternal grandmother of infant, paternal grandmother of infant, maternal/paternal aunts/uncles of infant, and [employer] friends. Infant will follow up with [facility], mother is comfortable scheduling appointments for infant, and has reliable transportation for appointments. Mother is current with WIC, and aware of how to get infant added to her plan. Mother shared she has been clean from heroin for over one year, and weaned off Methadone prior to delivery of infant. CSW praised mother for her sobriety and ability to wean " off MAT. CSW spent time building rapport with mother and FOB, and offered validation, support, and encouragement to them throughout the assessment. CSW provided a packet of resources to mother including: WIC, HANDS, transportation, infant supplies, counseling, online support groups, postpartum mood and anxiety resources, and general community resources. Mother was polite and cooperative, and denied having unmet needs at this time. CSW will follow infant's cord toxicology, and complete mandated reporting to CPS if warranted. Tracy A, CSW                  Continued Care and Services - Admitted Since 12/22/2023    Coordination has not been started for this encounter.          Demographic Summary       Row Name 12/23/23 1529       General Information    Admission Type inpatient    Arrived From home    Referral Source nursing    Reason for Consult psychosocial concerns;community resources    General Information Comments hx of THUAN-mom clean for >1 year, resources/support                   Functional Status       Row Name 12/23/23 1524       Mental Status    General Appearance WDL WDL       Mental Status Summary    Recent Changes in Mental Status/Cognitive Functioning no changes                   Psychosocial       Row Name 12/23/23 1530       Behavior WDL    Behavior WDL WDL       Emotion Mood WDL    Emotion/Mood/Affect WDL WDL       Speech WDL    Speech WDL WDL       Perceptual State WDL    Perceptual State WDL WDL       Thought Process WDL    Thought Process WDL WDL       Intellectual Performance WDL    Intellectual Performance WDL WDL       Coping/Stress    Major Change/Loss/Stressor birth    Patient Personal Strengths able to adapt;courageous;suki/spirituality;flexibility;motivated;positive attitude;strong support system    Sources of Support [employer];other family members;parent(s);sibling(s);spouse                   Abuse/Neglect       Row Name 12/23/23 1530       Personal Safety    Physical  Signs of Abuse Present no                   Legal    No documentation.                  Substance Abuse       Row Name 12/23/23 1531       Substance Use    Substance Use Comment mom has been clean for >1 year from heroin, weaned off Methadone prior to delivery                   Patient Forms    No documentation.                     RAQUEL Eldridge

## 2023-12-23 NOTE — PROGRESS NOTES
[facility]   Obstetrics and Gynecology     2023    Name:Alicia Bailey   MR#:[id number]    Vaginal Delivery Progress Note    HD#1    Subjective   Postpartum Day 1: 28 y.o. female  delivered at 38w1d  delivered a male  infant.     The patient feels well.  Her pain is controlled.    She is ambulating well.  Patient describes her bleeding as thin lochia.    Breastfeeding/bottle:  The patient is not currently breastfeeding.    Patient Active Problem List   Diagnosis    Obesity in pregnancy, antepartum    History of gestational hypertension    Supervision of normal pregnancy    Maternal varicella, non-immune    Methadone maintenance treatment affecting pregnancy    Nausea and vomiting in pregnancy    Pregnancy    Full-term premature rupture of membranes    Vaginal delivery    Shoulder dystocia during labor and delivery       Objective   Vital Signs Range for the last 24 hours  Temp: Temp:  [97.5 °F (36.4 °C)-98.6 °F (37 °C)] 97.9 °F (36.6 °C) Temp src: Oral   BP: BP: ()/() 139/86        Pulse: Heart Rate:  [] 95  RR: Resp:  [16-18] 16  Weight: 133 kg (292 lb 6.4 oz)  BMI:  Body mass index is 44.46 kg/m².    Results from last 7 days   Lab Units 23  0837   WBC 10*3/mm3 9.38   HEMOGLOBIN g/dL 9.7*   HEMATOCRIT % 28.9*   PLATELETS 10*3/mm3 352     Results from last 7 days   Lab Units 23  0837   SODIUM mmol/L 135*   POTASSIUM mmol/L 3.4*   CHLORIDE mmol/L 104   CO2 mmol/L 17.3*   BUN mg/dL 10   CREATININE mg/dL 0.62   CALCIUM mg/dL 8.7   ALK PHOS U/L 187*   ALT (SGPT) U/L 31   AST (SGOT) U/L 30   GLUCOSE mg/dL 111*       Physical Exam  Well no distress  Regular, nonlabored breathing  Fundus firm, well below umbilicus  + pitting edema pedal bilaterally  Calves nontender    Assessment/Plan   1.  PPD# 1    Pregnancy    Obesity in pregnancy, antepartum    History of gestational hypertension    Methadone maintenance treatment affecting pregnancy    Full-term premature  rupture of membranes    Vaginal delivery    Shoulder dystocia during labor and delivery      Plan:   She notes hx of possible cHTN. BP is mildly elevated but doesn't warrant starting medication at this time, will monitor closely    Desires circumcision. Discussed is cosmetic. Risks of bleeding, damage to penis, infection and signs of infection, aftercare discussed. Desires to proceed.      Adriana Fuentes MD  12/23/2023 09:21 EST

## 2023-12-23 NOTE — PLAN OF CARE
Problem: Bleeding (Labor)  Goal: Hemostasis  Outcome: Met     Problem: Change in Fetal Wellbeing (Labor)  Goal: Stable Fetal Wellbeing  Outcome: Met  Intervention: Promote and Monitor Fetal Wellbeing  Recent Flowsheet Documentation  Taken 12/22/2023 1949 by Rita Anderson RN  Body Position: sitting up in bed     Problem: Delayed Labor Progression (Labor)  Goal: Effective Progression to Delivery  Outcome: Met     Problem: Infection (Labor)  Goal: Absence of Infection Signs and Symptoms  Outcome: Met     Problem: Labor Pain (Labor)  Goal: Acceptable Pain Control  Outcome: Met     Problem: Uterine Tachysystole (Labor)  Goal: Normal Uterine Contraction Pattern  Outcome: Met     Problem: Pain Acute  Goal: Acceptable Pain Control and Functional Ability  Outcome: Met     Problem: Device-Related Complication Risk (Anesthesia/Analgesia, Neuraxial)  Goal: Safe Infusion Delivery Completion  Outcome: Met     Problem: Infection (Anesthesia/Analgesia, Neuraxial)  Goal: Absence of Infection Signs and Symptoms  Outcome: Met     Problem: Nausea and Vomiting (Anesthesia/Analgesia, Neuraxial)  Goal: Nausea and Vomiting Relief  Outcome: Met     Problem: Pain (Anesthesia/Analgesia, Neuraxial)  Goal: Effective Pain Control  Outcome: Met     Problem: Respiratory Compromise (Anesthesia/Analgesia, Neuraxial)  Goal: Effective Oxygenation and Ventilation  Outcome: Met   Goal Outcome Evaluation:

## 2023-12-23 NOTE — ANESTHESIA POSTPROCEDURE EVALUATION
Patient: Alicia Bailey    Procedure Summary       Date: 12/22/23 Room / Location:     Anesthesia Start: 0954 Anesthesia Stop: 1924    Procedure: LABOR ANALGESIA Diagnosis:     Scheduled Providers:  Provider: Chris Morales MD    Anesthesia Type: epidural ASA Status: 3            Anesthesia Type: epidural    Vitals  Vitals Value Taken Time   /91 12/22/23 2001   Temp 37 °C (98.6 °F) 12/22/23 1949   Pulse 97 12/22/23 2001   Resp     SpO2 99 % 12/22/23 1846   Vitals shown include unfiled device data.        Anesthesia Post Evaluation

## 2023-12-24 ENCOUNTER — APPOINTMENT (OUTPATIENT)
Dept: CARDIOLOGY | Facility: HOSPITAL | Age: 28
End: 2023-12-24
Payer: COMMERCIAL

## 2023-12-24 VITALS
BODY MASS INDEX: 44.31 KG/M2 | WEIGHT: 292.4 LBS | HEIGHT: 68 IN | SYSTOLIC BLOOD PRESSURE: 135 MMHG | OXYGEN SATURATION: 99 % | RESPIRATION RATE: 18 BRPM | HEART RATE: 90 BPM | TEMPERATURE: 98.1 F | DIASTOLIC BLOOD PRESSURE: 84 MMHG

## 2023-12-24 LAB
BH CV LOWER VASCULAR LEFT COMMON FEMORAL AUGMENT: NORMAL
BH CV LOWER VASCULAR LEFT COMMON FEMORAL COMPETENT: NORMAL
BH CV LOWER VASCULAR LEFT COMMON FEMORAL COMPRESS: NORMAL
BH CV LOWER VASCULAR LEFT COMMON FEMORAL PHASIC: NORMAL
BH CV LOWER VASCULAR LEFT COMMON FEMORAL SPONT: NORMAL
BH CV LOWER VASCULAR LEFT DISTAL FEMORAL COMPRESS: NORMAL
BH CV LOWER VASCULAR LEFT GASTRONEMIUS COMPRESS: NORMAL
BH CV LOWER VASCULAR LEFT GREATER SAPH AK COMPRESS: NORMAL
BH CV LOWER VASCULAR LEFT GREATER SAPH BK COMPRESS: NORMAL
BH CV LOWER VASCULAR LEFT LESSER SAPH COMPRESS: NORMAL
BH CV LOWER VASCULAR LEFT MID FEMORAL AUGMENT: NORMAL
BH CV LOWER VASCULAR LEFT MID FEMORAL COMPETENT: NORMAL
BH CV LOWER VASCULAR LEFT MID FEMORAL COMPRESS: NORMAL
BH CV LOWER VASCULAR LEFT MID FEMORAL PHASIC: NORMAL
BH CV LOWER VASCULAR LEFT MID FEMORAL SPONT: NORMAL
BH CV LOWER VASCULAR LEFT PERONEAL COMPRESS: NORMAL
BH CV LOWER VASCULAR LEFT POPLITEAL AUGMENT: NORMAL
BH CV LOWER VASCULAR LEFT POPLITEAL COMPETENT: NORMAL
BH CV LOWER VASCULAR LEFT POPLITEAL COMPRESS: NORMAL
BH CV LOWER VASCULAR LEFT POPLITEAL PHASIC: NORMAL
BH CV LOWER VASCULAR LEFT POPLITEAL SPONT: NORMAL
BH CV LOWER VASCULAR LEFT POSTERIOR TIBIAL COMPRESS: NORMAL
BH CV LOWER VASCULAR LEFT PROFUNDA FEMORAL COMPRESS: NORMAL
BH CV LOWER VASCULAR LEFT PROXIMAL FEMORAL COMPRESS: NORMAL
BH CV LOWER VASCULAR LEFT SAPHENOFEMORAL JUNCTION COMPRESS: NORMAL
BH CV LOWER VASCULAR RIGHT COMMON FEMORAL AUGMENT: NORMAL
BH CV LOWER VASCULAR RIGHT COMMON FEMORAL COMPETENT: NORMAL
BH CV LOWER VASCULAR RIGHT COMMON FEMORAL COMPRESS: NORMAL
BH CV LOWER VASCULAR RIGHT COMMON FEMORAL PHASIC: NORMAL
BH CV LOWER VASCULAR RIGHT COMMON FEMORAL SPONT: NORMAL

## 2023-12-24 PROCEDURE — 93971 EXTREMITY STUDY: CPT

## 2023-12-24 RX ORDER — AMOXICILLIN 250 MG
1 CAPSULE ORAL DAILY
Qty: 60 TABLET | Refills: 0 | Status: SHIPPED | OUTPATIENT
Start: 2023-12-24

## 2023-12-24 RX ORDER — ACETAMINOPHEN 500 MG
500 TABLET ORAL EVERY 6 HOURS PRN
Qty: 30 TABLET | Refills: 0 | Status: SHIPPED | OUTPATIENT
Start: 2023-12-24

## 2023-12-24 RX ADMIN — IBUPROFEN 600 MG: 600 TABLET, FILM COATED ORAL at 08:32

## 2023-12-24 RX ADMIN — DOCUSATE SODIUM 100 MG: 100 CAPSULE, LIQUID FILLED ORAL at 08:30

## 2023-12-24 RX ADMIN — FERROUS SULFATE TAB 325 MG (65 MG ELEMENTAL FE) 325 MG: 325 (65 FE) TAB at 08:30

## 2023-12-24 RX ADMIN — Medication 1 TABLET: at 08:30

## 2023-12-24 NOTE — PLAN OF CARE
Goal Outcome Evaluation:  Plan of Care Reviewed With: patient        Progress: improving  Outcome Evaluation: VSS.  Pt up ad tina and voiding without difficulty.  Fundal assessment and lochia, wnl.  Pain controlled w/prn ibuprofen.

## 2023-12-24 NOTE — DISCHARGE SUMMARY
[facility]  Delivery Discharge Summary    Primary OB Clinician: Graciela Jimenez MD    Admission Diagnosis:  Principal Problem:    Pregnancy  Active Problems:    Obesity in pregnancy, antepartum    History of gestational hypertension    Methadone maintenance treatment affecting pregnancy    Full-term premature rupture of membranes    Vaginal delivery    Shoulder dystocia during labor and delivery      Discharge Diagnosis:  S/p spontaneous vaginal delivery    Gestational Age: 38w1d    Date of Delivery: 2023     Delivery Type: Vaginal, Spontaneous      Intrapartum Course: See delivery note for details.    Postpartum Course:  Uncomplicated pp course. Pt is tolerating po well, ambulating and voiding without difficulty.  Pain is well controlled. Bleeding is minimal/ appropriate for pp state.     Physical Exam:    Vitals:   Vitals:    23 0900 23 1634 23 0004 23 0806   BP: 139/86 138/92 127/90 135/84   BP Location: Right arm Right arm Right arm Right arm   Patient Position: Lying Lying Lying Sitting   Pulse: 95 101 86 90   Resp: 16 18 18 18   Temp: 97.9 °F (36.6 °C) 98.4 °F (36.9 °C) 97.5 °F (36.4 °C) 98.1 °F (36.7 °C)   TempSrc: Oral Oral Oral Oral   SpO2:       Weight:       Height:         Temp (24hrs), Av °F (36.7 °C), Min:97.5 °F (36.4 °C), Max:98.4 °F (36.9 °C)      General Appearance:    Alert, cooperative, in no acute distress   Abdomen:    Fundus firm below umbilicus, nontender and benign non-tender, without masses or organomegaly palpable           Extremities: Moves all extremities well, 2+ edema , no cyanosis, no redness.     Labs:   Results from last 7 days   Lab Units 23  0901 23  0837   WBC 10*3/mm3 11.68* 9.38   HEMOGLOBIN g/dL 10.2* 9.7*   HEMATOCRIT % 31.2* 28.9*   PLATELETS 10*3/mm3 349 352     Results from last 7 days   Lab Units 23  0837   GLUCOSE mg/dL 111*         Discharge Medications:     Discharge Medications        New Medications         Instructions Start Date   acetaminophen 500 MG tablet  Commonly known as: TYLENOL   500 mg, Oral, Every 6 Hours PRN      sennosides-docusate 8.6-50 MG per tablet  Commonly known as: PERICOLACE   1 tablet, Oral, Daily             Continue These Medications        Instructions Start Date   ferrous sulfate 325 (65 FE) MG tablet   325 mg, Oral, Daily With Breakfast      WesTab Plus 27-1 MG tablet   1 tablet, Oral, Daily               Feeding method: Breastfeeding Status: No    Blood Type: RH Positive      Plan:  Discharge to home.    Follow-up appointment with  Lisandro Brown   in 6 week.  All discharge instructions were reviewed with pt including bleeding warnings, s/sx of pp depression, and warning signs in the pp period for which to seek medical attention including but not limited to s/sx of hypertension and thromboembolism.

## 2024-01-02 NOTE — PROGRESS NOTES
"Continued Stay Note  [facility]     Patient Name: Alicia Bailey  MRN: [id number]  Today's Date: 1/2/2024    Admit Date: 12/22/2023    Plan: Infant may discharge to mother when medically ready. CSW will follow cord tox. ALONSO Palomares   Discharge Plan       Row Name 01/02/24 1117       Plan    Plan Comments Mother: Alicia Bailey, MRN [id number]; Infant: SanjuanaBokieran \"Domingo\" Lynn, MRN [id number]. CSW reviewed cord toxicology for infant, and it was positive Gabapentin; this is lab confirmed. CSW submitted a CPS report (WebID # [id number]) ALONSO Perez.                   Discharge Codes    No documentation.                 Expected Discharge Date and Time       Expected Discharge Date Expected Discharge Time    Dec 24, 2023               RAQUEL Kessler    "

## 2024-02-02 ENCOUNTER — TELEPHONE (OUTPATIENT)
Dept: OBSTETRICS AND GYNECOLOGY | Age: 29
End: 2024-02-02

## 2024-02-02 NOTE — TELEPHONE ENCOUNTER
Caller: Alicia Bailey    Relationship to patient: Self    Best call back number: [phone] (home)      PT RESCHEDULED TODAY'S APPT@11;30 TO 02-@1:30.